# Patient Record
Sex: FEMALE | Race: BLACK OR AFRICAN AMERICAN | Employment: FULL TIME | URBAN - METROPOLITAN AREA
[De-identification: names, ages, dates, MRNs, and addresses within clinical notes are randomized per-mention and may not be internally consistent; named-entity substitution may affect disease eponyms.]

---

## 2017-01-12 ENCOUNTER — OFFICE VISIT (OUTPATIENT)
Dept: INTERNAL MEDICINE | Age: 52
End: 2017-01-12

## 2017-01-12 VITALS
RESPIRATION RATE: 16 BRPM | WEIGHT: 222 LBS | BODY MASS INDEX: 40.85 KG/M2 | TEMPERATURE: 97.8 F | HEART RATE: 68 BPM | DIASTOLIC BLOOD PRESSURE: 66 MMHG | SYSTOLIC BLOOD PRESSURE: 118 MMHG | HEIGHT: 62 IN

## 2017-01-12 DIAGNOSIS — Z23 FLU VACCINE NEED: ICD-10-CM

## 2017-01-12 DIAGNOSIS — K21.9 GASTROESOPHAGEAL REFLUX DISEASE, ESOPHAGITIS PRESENCE NOT SPECIFIED: ICD-10-CM

## 2017-01-12 DIAGNOSIS — I10 HTN (HYPERTENSION), BENIGN: Primary | ICD-10-CM

## 2017-01-12 DIAGNOSIS — E66.01 MORBID OBESITY DUE TO EXCESS CALORIES (HCC): ICD-10-CM

## 2017-01-12 DIAGNOSIS — E78.2 MIXED HYPERLIPIDEMIA: ICD-10-CM

## 2017-01-12 LAB
ALBUMIN SERPL-MCNC: 4.1 G/DL (ref 3.4–5)
ALP BLD-CCNC: 98 U/L (ref 40–129)
ALT SERPL-CCNC: 19 U/L (ref 10–40)
ANION GAP SERPL CALCULATED.3IONS-SCNC: 15 MMOL/L (ref 3–16)
AST SERPL-CCNC: 21 U/L (ref 15–37)
BASOPHILS ABSOLUTE: 0.1 K/UL (ref 0–0.2)
BASOPHILS RELATIVE PERCENT: 1.5 %
BILIRUB SERPL-MCNC: <0.2 MG/DL (ref 0–1)
BILIRUBIN DIRECT: <0.2 MG/DL (ref 0–0.3)
BILIRUBIN, INDIRECT: NORMAL MG/DL (ref 0–1)
BUN BLDV-MCNC: 12 MG/DL (ref 7–20)
CALCIUM SERPL-MCNC: 9.1 MG/DL (ref 8.3–10.6)
CHLORIDE BLD-SCNC: 100 MMOL/L (ref 99–110)
CHOLESTEROL, TOTAL: 225 MG/DL (ref 0–199)
CO2: 24 MMOL/L (ref 21–32)
CREAT SERPL-MCNC: 0.8 MG/DL (ref 0.6–1.1)
EOSINOPHILS ABSOLUTE: 0.3 K/UL (ref 0–0.6)
EOSINOPHILS RELATIVE PERCENT: 4.3 %
GFR AFRICAN AMERICAN: >60
GFR NON-AFRICAN AMERICAN: >60
GLUCOSE BLD-MCNC: 86 MG/DL (ref 70–99)
HCT VFR BLD CALC: 37.6 % (ref 36–48)
HDLC SERPL-MCNC: 70 MG/DL (ref 40–60)
HEMOGLOBIN: 12.3 G/DL (ref 12–16)
LDL CHOLESTEROL CALCULATED: 129 MG/DL
LYMPHOCYTES ABSOLUTE: 2.3 K/UL (ref 1–5.1)
LYMPHOCYTES RELATIVE PERCENT: 33.7 %
MCH RBC QN AUTO: 27.4 PG (ref 26–34)
MCHC RBC AUTO-ENTMCNC: 32.7 G/DL (ref 31–36)
MCV RBC AUTO: 83.6 FL (ref 80–100)
MONOCYTES ABSOLUTE: 0.6 K/UL (ref 0–1.3)
MONOCYTES RELATIVE PERCENT: 9 %
NEUTROPHILS ABSOLUTE: 3.5 K/UL (ref 1.7–7.7)
NEUTROPHILS RELATIVE PERCENT: 51.5 %
PDW BLD-RTO: 16 % (ref 12.4–15.4)
PLATELET # BLD: 290 K/UL (ref 135–450)
PMV BLD AUTO: 8 FL (ref 5–10.5)
POTASSIUM SERPL-SCNC: 4.3 MMOL/L (ref 3.5–5.1)
RBC # BLD: 4.5 M/UL (ref 4–5.2)
SODIUM BLD-SCNC: 139 MMOL/L (ref 136–145)
TOTAL PROTEIN: 7.1 G/DL (ref 6.4–8.2)
TRIGL SERPL-MCNC: 131 MG/DL (ref 0–150)
VLDLC SERPL CALC-MCNC: 26 MG/DL
WBC # BLD: 6.8 K/UL (ref 4–11)

## 2017-01-12 PROCEDURE — 99214 OFFICE O/P EST MOD 30 MIN: CPT | Performed by: INTERNAL MEDICINE

## 2017-01-12 PROCEDURE — G8484 FLU IMMUNIZE NO ADMIN: HCPCS | Performed by: INTERNAL MEDICINE

## 2017-01-12 PROCEDURE — 36415 COLL VENOUS BLD VENIPUNCTURE: CPT | Performed by: INTERNAL MEDICINE

## 2017-01-12 PROCEDURE — G8427 DOCREV CUR MEDS BY ELIG CLIN: HCPCS | Performed by: INTERNAL MEDICINE

## 2017-01-12 PROCEDURE — G0008 ADMIN INFLUENZA VIRUS VAC: HCPCS | Performed by: INTERNAL MEDICINE

## 2017-01-12 PROCEDURE — G8419 CALC BMI OUT NRM PARAM NOF/U: HCPCS | Performed by: INTERNAL MEDICINE

## 2017-01-12 PROCEDURE — 1036F TOBACCO NON-USER: CPT | Performed by: INTERNAL MEDICINE

## 2017-01-12 PROCEDURE — 90686 IIV4 VACC NO PRSV 0.5 ML IM: CPT | Performed by: INTERNAL MEDICINE

## 2017-01-12 PROCEDURE — 3017F COLORECTAL CA SCREEN DOC REV: CPT | Performed by: INTERNAL MEDICINE

## 2017-01-12 PROCEDURE — 3014F SCREEN MAMMO DOC REV: CPT | Performed by: INTERNAL MEDICINE

## 2017-01-12 ASSESSMENT — PATIENT HEALTH QUESTIONNAIRE - PHQ9
SUM OF ALL RESPONSES TO PHQ9 QUESTIONS 1 & 2: 0
2. FEELING DOWN, DEPRESSED OR HOPELESS: 0
1. LITTLE INTEREST OR PLEASURE IN DOING THINGS: 0
SUM OF ALL RESPONSES TO PHQ QUESTIONS 1-9: 0

## 2017-05-01 RX ORDER — LISINOPRIL 10 MG/1
TABLET ORAL
Qty: 90 TABLET | Refills: 3 | Status: SHIPPED | OUTPATIENT
Start: 2017-05-01 | End: 2018-04-18 | Stop reason: SDUPTHER

## 2017-06-28 ENCOUNTER — TELEPHONE (OUTPATIENT)
Dept: INTERNAL MEDICINE CLINIC | Age: 52
End: 2017-06-28

## 2017-06-28 ENCOUNTER — OFFICE VISIT (OUTPATIENT)
Dept: INTERNAL MEDICINE CLINIC | Age: 52
End: 2017-06-28

## 2017-06-28 VITALS
HEART RATE: 73 BPM | WEIGHT: 221 LBS | OXYGEN SATURATION: 95 % | DIASTOLIC BLOOD PRESSURE: 87 MMHG | BODY MASS INDEX: 41.72 KG/M2 | SYSTOLIC BLOOD PRESSURE: 119 MMHG | HEIGHT: 61 IN

## 2017-06-28 DIAGNOSIS — Z12.31 ENCOUNTER FOR SCREENING MAMMOGRAM FOR MALIGNANT NEOPLASM OF BREAST: ICD-10-CM

## 2017-06-28 DIAGNOSIS — Z00.00 PREVENTATIVE HEALTH CARE: ICD-10-CM

## 2017-06-28 DIAGNOSIS — E66.01 MORBID OBESITY DUE TO EXCESS CALORIES (HCC): ICD-10-CM

## 2017-06-28 DIAGNOSIS — I10 ESSENTIAL HYPERTENSION: Primary | ICD-10-CM

## 2017-06-28 PROCEDURE — 99214 OFFICE O/P EST MOD 30 MIN: CPT | Performed by: INTERNAL MEDICINE

## 2017-06-28 PROCEDURE — G8417 CALC BMI ABV UP PARAM F/U: HCPCS | Performed by: INTERNAL MEDICINE

## 2017-06-28 PROCEDURE — G8427 DOCREV CUR MEDS BY ELIG CLIN: HCPCS | Performed by: INTERNAL MEDICINE

## 2017-06-28 PROCEDURE — 3014F SCREEN MAMMO DOC REV: CPT | Performed by: INTERNAL MEDICINE

## 2017-06-28 PROCEDURE — 3017F COLORECTAL CA SCREEN DOC REV: CPT | Performed by: INTERNAL MEDICINE

## 2017-06-28 PROCEDURE — 1036F TOBACCO NON-USER: CPT | Performed by: INTERNAL MEDICINE

## 2017-06-28 ASSESSMENT — ENCOUNTER SYMPTOMS
BLURRED VISION: 0
SHORTNESS OF BREATH: 0
ORTHOPNEA: 0
GASTROINTESTINAL NEGATIVE: 1
ALLERGIC/IMMUNOLOGIC NEGATIVE: 1

## 2017-06-29 ENCOUNTER — TELEPHONE (OUTPATIENT)
Dept: INTERNAL MEDICINE CLINIC | Age: 52
End: 2017-06-29

## 2017-06-29 ENCOUNTER — TELEPHONE (OUTPATIENT)
Dept: BARIATRICS/WEIGHT MGMT | Age: 52
End: 2017-06-29

## 2017-11-09 ENCOUNTER — OFFICE VISIT (OUTPATIENT)
Dept: INTERNAL MEDICINE CLINIC | Age: 52
End: 2017-11-09

## 2017-11-09 VITALS
BODY MASS INDEX: 43.59 KG/M2 | HEART RATE: 64 BPM | SYSTOLIC BLOOD PRESSURE: 132 MMHG | DIASTOLIC BLOOD PRESSURE: 68 MMHG | WEIGHT: 222 LBS | HEIGHT: 60 IN | RESPIRATION RATE: 16 BRPM

## 2017-11-09 DIAGNOSIS — I10 HTN (HYPERTENSION), BENIGN: ICD-10-CM

## 2017-11-09 DIAGNOSIS — E78.2 MIXED HYPERLIPIDEMIA: ICD-10-CM

## 2017-11-09 DIAGNOSIS — Z00.00 PREVENTATIVE HEALTH CARE: ICD-10-CM

## 2017-11-09 DIAGNOSIS — E66.01 MORBID OBESITY DUE TO EXCESS CALORIES (HCC): ICD-10-CM

## 2017-11-09 DIAGNOSIS — I10 HTN (HYPERTENSION), BENIGN: Primary | ICD-10-CM

## 2017-11-09 LAB
T4 FREE: 0.9 NG/DL (ref 0.9–1.8)
TSH SERPL DL<=0.05 MIU/L-ACNC: 1.88 UIU/ML (ref 0.27–4.2)

## 2017-11-09 PROCEDURE — G0008 ADMIN INFLUENZA VIRUS VAC: HCPCS | Performed by: INTERNAL MEDICINE

## 2017-11-09 PROCEDURE — 3017F COLORECTAL CA SCREEN DOC REV: CPT | Performed by: INTERNAL MEDICINE

## 2017-11-09 PROCEDURE — G8484 FLU IMMUNIZE NO ADMIN: HCPCS | Performed by: INTERNAL MEDICINE

## 2017-11-09 PROCEDURE — 3014F SCREEN MAMMO DOC REV: CPT | Performed by: INTERNAL MEDICINE

## 2017-11-09 PROCEDURE — G8427 DOCREV CUR MEDS BY ELIG CLIN: HCPCS | Performed by: INTERNAL MEDICINE

## 2017-11-09 PROCEDURE — G8417 CALC BMI ABV UP PARAM F/U: HCPCS | Performed by: INTERNAL MEDICINE

## 2017-11-09 PROCEDURE — 1036F TOBACCO NON-USER: CPT | Performed by: INTERNAL MEDICINE

## 2017-11-09 PROCEDURE — 90686 IIV4 VACC NO PRSV 0.5 ML IM: CPT | Performed by: INTERNAL MEDICINE

## 2017-11-09 PROCEDURE — 99214 OFFICE O/P EST MOD 30 MIN: CPT | Performed by: INTERNAL MEDICINE

## 2017-11-09 ASSESSMENT — ENCOUNTER SYMPTOMS
COUGH: 0
GASTROINTESTINAL NEGATIVE: 1
SHORTNESS OF BREATH: 0

## 2017-11-10 LAB — HIV-1 AND HIV-2 ANTIBODIES: NORMAL

## 2017-11-10 NOTE — PROGRESS NOTES
Subjective:      Patient ID: Hayley Pizarro is a 47 y.o. female. F/u for HTN and Obesity, she feels good, her breathing is good and she deneis chest pain,  insurance will not cover her Bariatric care, she has been trying to diet on her own and she has started to exercise,          Review of Systems   Constitutional: Negative. Negative for activity change, appetite change and unexpected weight change. Eyes: Positive for visual disturbance. Respiratory: Negative for cough and shortness of breath. Cardiovascular: Negative. Negative for chest pain. Gastrointestinal: Negative. Endocrine: Negative. Negative for polydipsia, polyphagia and polyuria. Genitourinary: Negative. Musculoskeletal: Negative. Skin: Negative for rash. Allergic/Immunologic: Negative for immunocompromised state. Neurological: Negative. Negative for syncope, weakness and headaches. Hematological: Negative. Psychiatric/Behavioral: Negative. Negative for dysphoric mood and sleep disturbance. 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 She appears well-developed and well-nourished. She does not have a sickly appearance. No distress. Very obese habitus   HENT:   Head: Normocephalic and atraumatic. Neck: No JVD present. Cardiovascular: Normal rate, regular rhythm and normal heart sounds. Exam reveals no gallop. No murmur heard. Pulmonary/Chest: Effort normal and breath sounds normal. No respiratory distress. She has no wheezes. She has no rales.    Musculoskeletal: She exhibits no edema. Neurological: She is alert and oriented to person, place, and time. Coordination normal.   Skin: Skin is warm and dry. No rash noted. She is not diaphoretic. Psychiatric: She has a normal mood and affect. Her behavior is normal.   Vitals reviewed. /68 (Site: Right Arm, Position: Sitting, Cuff Size: Medium Adult)   Pulse 64 Comment: Regular  Resp 16   Ht 5' (1.524 m)   Wt 222 lb (100.7 kg)   BMI 43.36 kg/m²     Assessment:           ICD-10-CM ICD-9-CM    1. Hypertension, benign I10 401.1 TSH without Reflex      T4, Free   2. Morbid obesity due to excess calories (HCC) E66.01 278.01    3. Mixed hyperlipidemia E78.2 272.2    4. Preventative health care Z00.00 V70.0 HIV-1 AND HIV-2 ANTIBODIES      HTN controlled      her obesity is a major risk to her health and threat to her life      Plan:       continue current meds   check TFTs   reduce calories gradually by portion control, recommended weight watchers  Vaccinate today and screen for HIV    Orders Placed This Encounter   Procedures    INFLUENZA, QUADV, 3 YRS AND OLDER, IM, PF, PREFILL SYR OR SDV, 0.5ML (FLUZONE QUADV, PF)    TSH without Reflex    T4, Free    HIV-1 AND HIV-2 ANTIBODIES       Current Outpatient Prescriptions   Medication Sig Dispense Refill    lisinopril (PRINIVIL;ZESTRIL) 10 MG tablet TAKE ONE TABLET BY MOUTH DAILY 90 tablet 3    alum Hydroxide-Mag Carbonate 160-105 MG CHEW Chew 2 tablets every 4 hours as needed for indigestion.  Multiple Vitamin (MULTI VITAMIN DAILY PO) Take  by mouth.  calcium carbonate (OSCAL) 500 MG TABS tablet Take 600 mg by mouth daily.  Artificial Tear Ointment (DRY EYES OP) Apply 1 drop to eye 2 times daily       latanoprost (XALATAN) 0.005 % ophthalmic solution Place 1 drop into the right eye nightly.  brimonidine-timolol (COMBIGAN) 0.2-0.5 % ophthalmic solution Place 1 drop into the right eye every 12 hours.        No current facility-administered medications for this visit.       15 mins spent counseling patient on health maintenance, healthy diet, exercise, other therapeutic lifestyle changes, guideline indicated screening and vaccines for preventable diseases, addressing patient's concerns, reviewing test results through EMR and elsewhere as available, reviewing and summarizing health records, and coordinating care    AVS and education print provided    Macy Grullon  11/9/2017

## 2018-04-20 RX ORDER — LISINOPRIL 10 MG/1
TABLET ORAL
Qty: 90 TABLET | Refills: 2 | Status: SHIPPED | OUTPATIENT
Start: 2018-04-20 | End: 2018-10-22 | Stop reason: SDUPTHER

## 2018-10-22 RX ORDER — LISINOPRIL 10 MG/1
TABLET ORAL
Qty: 30 TABLET | Refills: 0 | Status: SHIPPED | OUTPATIENT
Start: 2018-10-22 | End: 2018-10-25 | Stop reason: SDUPTHER

## 2018-10-25 ENCOUNTER — OFFICE VISIT (OUTPATIENT)
Dept: INTERNAL MEDICINE CLINIC | Age: 53
End: 2018-10-25
Payer: MEDICARE

## 2018-10-25 VITALS
WEIGHT: 218.2 LBS | OXYGEN SATURATION: 99 % | BODY MASS INDEX: 41.19 KG/M2 | DIASTOLIC BLOOD PRESSURE: 80 MMHG | HEART RATE: 62 BPM | RESPIRATION RATE: 12 BRPM | HEIGHT: 61 IN | SYSTOLIC BLOOD PRESSURE: 138 MMHG

## 2018-10-25 DIAGNOSIS — I10 HTN (HYPERTENSION), BENIGN: Primary | ICD-10-CM

## 2018-10-25 DIAGNOSIS — Z23 NEEDS FLU SHOT: ICD-10-CM

## 2018-10-25 DIAGNOSIS — Z12.11 COLON CANCER SCREENING: ICD-10-CM

## 2018-10-25 DIAGNOSIS — Z86.718 HISTORY OF DVT (DEEP VEIN THROMBOSIS): ICD-10-CM

## 2018-10-25 DIAGNOSIS — E66.01 MORBID OBESITY WITH BMI OF 40.0-44.9, ADULT (HCC): ICD-10-CM

## 2018-10-25 DIAGNOSIS — Z23 NEED FOR PROPHYLACTIC VACCINATION AND INOCULATION AGAINST VARICELLA: ICD-10-CM

## 2018-10-25 DIAGNOSIS — I10 HTN (HYPERTENSION), BENIGN: ICD-10-CM

## 2018-10-25 DIAGNOSIS — D12.6 ADENOMATOUS POLYP OF COLON, UNSPECIFIED PART OF COLON: ICD-10-CM

## 2018-10-25 PROCEDURE — G8510 SCR DEP NEG, NO PLAN REQD: HCPCS | Performed by: INTERNAL MEDICINE

## 2018-10-25 PROCEDURE — 90686 IIV4 VACC NO PRSV 0.5 ML IM: CPT | Performed by: INTERNAL MEDICINE

## 2018-10-25 PROCEDURE — 3017F COLORECTAL CA SCREEN DOC REV: CPT | Performed by: INTERNAL MEDICINE

## 2018-10-25 PROCEDURE — G0008 ADMIN INFLUENZA VIRUS VAC: HCPCS | Performed by: INTERNAL MEDICINE

## 2018-10-25 PROCEDURE — 1036F TOBACCO NON-USER: CPT | Performed by: INTERNAL MEDICINE

## 2018-10-25 PROCEDURE — G8482 FLU IMMUNIZE ORDER/ADMIN: HCPCS | Performed by: INTERNAL MEDICINE

## 2018-10-25 PROCEDURE — G8417 CALC BMI ABV UP PARAM F/U: HCPCS | Performed by: INTERNAL MEDICINE

## 2018-10-25 PROCEDURE — G8427 DOCREV CUR MEDS BY ELIG CLIN: HCPCS | Performed by: INTERNAL MEDICINE

## 2018-10-25 PROCEDURE — 99214 OFFICE O/P EST MOD 30 MIN: CPT | Performed by: INTERNAL MEDICINE

## 2018-10-25 RX ORDER — LISINOPRIL 10 MG/1
TABLET ORAL
Qty: 90 TABLET | Refills: 1 | Status: SHIPPED | OUTPATIENT
Start: 2018-10-25 | End: 2019-06-25 | Stop reason: SDUPTHER

## 2018-10-25 ASSESSMENT — ENCOUNTER SYMPTOMS
DIARRHEA: 0
COUGH: 0
SINUS PRESSURE: 0
CONSTIPATION: 0
SINUS PAIN: 0
WHEEZING: 0
PHOTOPHOBIA: 0
BACK PAIN: 0
ABDOMINAL PAIN: 0
TROUBLE SWALLOWING: 0
COLOR CHANGE: 0
VOMITING: 0
SHORTNESS OF BREATH: 0
EYE PAIN: 0
NAUSEA: 0

## 2018-10-25 ASSESSMENT — PATIENT HEALTH QUESTIONNAIRE - PHQ9
SUM OF ALL RESPONSES TO PHQ9 QUESTIONS 1 & 2: 0
1. LITTLE INTEREST OR PLEASURE IN DOING THINGS: 0
SUM OF ALL RESPONSES TO PHQ QUESTIONS 1-9: 0
SUM OF ALL RESPONSES TO PHQ QUESTIONS 1-9: 0
2. FEELING DOWN, DEPRESSED OR HOPELESS: 0

## 2018-10-25 NOTE — PROGRESS NOTES
difficulty, weakness, light-headedness, numbness and headaches. Hematological: Negative for adenopathy (  ). Does not bruise/bleed easily. Psychiatric/Behavioral: Negative for dysphoric mood and sleep disturbance. The patient is not nervous/anxious. Prior to Visit Medications    Medication Sig Taking? Authorizing Provider   zoster recombinant adjuvanted vaccine (SHINGRIX) 50 MCG/0.5ML SUSR injection 50 MCG IM then repeat 2-6 months. Yes Geovany Byrne MD   lisinopril (PRINIVIL;ZESTRIL) 10 MG tablet TAKE ONE TABLET BY MOUTH DAILY Yes Geovany Byrne MD   alum Hydroxide-Mag Carbonate 160-105 MG CHEW Chew 2 tablets every 4 hours as needed for indigestion. Yes Historical Provider, MD   Artificial Tear Ointment (DRY EYES OP) Apply 1 drop to eye 2 times daily  Yes Historical Provider, MD   latanoprost (XALATAN) 0.005 % ophthalmic solution Place 1 drop into the right eye nightly. Yes Historical Provider, MD   brimonidine-timolol (COMBIGAN) 0.2-0.5 % ophthalmic solution Place 1 drop into the right eye every 12 hours. 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 Dr. Ariel Booth. Repeat in 3 years.     PLANTAR FASCIA SURGERY  2003    Left foot - endoscopic   

## 2018-10-26 LAB
ANION GAP SERPL CALCULATED.3IONS-SCNC: 14 MMOL/L (ref 3–16)
BUN BLDV-MCNC: 12 MG/DL (ref 7–20)
CALCIUM SERPL-MCNC: 9.4 MG/DL (ref 8.3–10.6)
CHLORIDE BLD-SCNC: 101 MMOL/L (ref 99–110)
CHOLESTEROL, TOTAL: 221 MG/DL (ref 0–199)
CO2: 26 MMOL/L (ref 21–32)
CREAT SERPL-MCNC: 0.8 MG/DL (ref 0.6–1.1)
GFR AFRICAN AMERICAN: >60
GFR NON-AFRICAN AMERICAN: >60
GLUCOSE BLD-MCNC: 86 MG/DL (ref 70–99)
HDLC SERPL-MCNC: 61 MG/DL (ref 40–60)
LDL CHOLESTEROL CALCULATED: 136 MG/DL
POTASSIUM SERPL-SCNC: 4.7 MMOL/L (ref 3.5–5.1)
SODIUM BLD-SCNC: 141 MMOL/L (ref 136–145)
TRIGL SERPL-MCNC: 121 MG/DL (ref 0–150)
VLDLC SERPL CALC-MCNC: 24 MG/DL

## 2018-10-31 ENCOUNTER — TELEPHONE (OUTPATIENT)
Dept: INTERNAL MEDICINE CLINIC | Age: 53
End: 2018-10-31

## 2018-10-31 DIAGNOSIS — I10 HTN (HYPERTENSION), BENIGN: ICD-10-CM

## 2018-10-31 DIAGNOSIS — E66.01 MORBID OBESITY DUE TO EXCESS CALORIES (HCC): Primary | ICD-10-CM

## 2018-10-31 DIAGNOSIS — E78.2 MIXED HYPERLIPIDEMIA: ICD-10-CM

## 2019-01-11 ENCOUNTER — TELEPHONE (OUTPATIENT)
Dept: INTERNAL MEDICINE CLINIC | Age: 54
End: 2019-01-11

## 2019-06-25 DIAGNOSIS — I10 HTN (HYPERTENSION), BENIGN: ICD-10-CM

## 2019-06-25 RX ORDER — LISINOPRIL 10 MG/1
TABLET ORAL
Qty: 90 TABLET | Refills: 0 | Status: SHIPPED | OUTPATIENT
Start: 2019-06-25 | End: 2019-09-30 | Stop reason: SDUPTHER

## 2019-09-30 ENCOUNTER — TELEPHONE (OUTPATIENT)
Dept: INTERNAL MEDICINE CLINIC | Age: 54
End: 2019-09-30

## 2019-09-30 DIAGNOSIS — I10 HTN (HYPERTENSION), BENIGN: ICD-10-CM

## 2019-09-30 RX ORDER — LISINOPRIL 10 MG/1
10 TABLET ORAL DAILY
Qty: 90 TABLET | Refills: 0 | Status: SHIPPED | OUTPATIENT
Start: 2019-09-30 | End: 2019-12-30

## 2019-09-30 NOTE — TELEPHONE ENCOUNTER
Last appointment: 10/25/2018  Next appointment: Visit date not found  Last refill: 6/25/19    Unable to reach patient, telephone just kept ringing. Sh is over due for follow up.  Needs to be scheduled and then enough medication will be given to last her until her appointment

## 2019-09-30 NOTE — TELEPHONE ENCOUNTER
Patient is requesting the following prescription refill please be sent to a new pharmacy:    1.    lisinopril (PRINIVIL;ZESTRIL) 10 MG tablet TAKE ONE TABLET BY MOUTH DAILY     #90    [facility]/pharmacy #[id number] Cornelius Romo, [address] - F [phone]     Patient made aware to allow 24 to 48 business hours for prescription refills. Patient can be reached @ phone # provided should there be any questions.

## 2019-11-01 ENCOUNTER — TELEPHONE (OUTPATIENT)
Dept: INTERNAL MEDICINE CLINIC | Age: 54
End: 2019-11-01

## 2019-11-01 NOTE — TELEPHONE ENCOUNTER
FYI:    Patient states her Current Insurance Plan terminated last night due to her company. She states she will call back to re-schedule. I informed her of [address], and she stated she will call back to re-schedule in December.

## 2019-12-30 DIAGNOSIS — I10 HTN (HYPERTENSION), BENIGN: ICD-10-CM

## 2019-12-30 RX ORDER — LISINOPRIL 10 MG/1
TABLET ORAL
Qty: 90 TABLET | Refills: 0 | Status: SHIPPED | OUTPATIENT
Start: 2019-12-30 | End: 2020-02-06 | Stop reason: SDUPTHER

## 2020-02-06 ENCOUNTER — OFFICE VISIT (OUTPATIENT)
Dept: INTERNAL MEDICINE CLINIC | Age: 55
End: 2020-02-06
Payer: COMMERCIAL

## 2020-02-06 VITALS
RESPIRATION RATE: 14 BRPM | BODY MASS INDEX: 40.64 KG/M2 | HEART RATE: 76 BPM | DIASTOLIC BLOOD PRESSURE: 78 MMHG | HEIGHT: 60 IN | SYSTOLIC BLOOD PRESSURE: 128 MMHG | OXYGEN SATURATION: 98 % | WEIGHT: 207 LBS

## 2020-02-06 PROBLEM — R73.03 PREDIABETES: Status: ACTIVE | Noted: 2020-02-06

## 2020-02-06 PROCEDURE — 3017F COLORECTAL CA SCREEN DOC REV: CPT | Performed by: INTERNAL MEDICINE

## 2020-02-06 PROCEDURE — G8417 CALC BMI ABV UP PARAM F/U: HCPCS | Performed by: INTERNAL MEDICINE

## 2020-02-06 PROCEDURE — 1036F TOBACCO NON-USER: CPT | Performed by: INTERNAL MEDICINE

## 2020-02-06 PROCEDURE — 99214 OFFICE O/P EST MOD 30 MIN: CPT | Performed by: INTERNAL MEDICINE

## 2020-02-06 PROCEDURE — G8427 DOCREV CUR MEDS BY ELIG CLIN: HCPCS | Performed by: INTERNAL MEDICINE

## 2020-02-06 PROCEDURE — G8482 FLU IMMUNIZE ORDER/ADMIN: HCPCS | Performed by: INTERNAL MEDICINE

## 2020-02-06 RX ORDER — LISINOPRIL 10 MG/1
10 TABLET ORAL DAILY
Qty: 90 TABLET | Refills: 3 | Status: SHIPPED | OUTPATIENT
Start: 2020-02-06 | End: 2021-01-07

## 2020-02-06 ASSESSMENT — ENCOUNTER SYMPTOMS
WHEEZING: 0
COUGH: 0
PHOTOPHOBIA: 0
COLOR CHANGE: 0
TROUBLE SWALLOWING: 0
ABDOMINAL PAIN: 0
BACK PAIN: 0
VOMITING: 0
CONSTIPATION: 0
SINUS PAIN: 0
EYE PAIN: 0
SINUS PRESSURE: 0
NAUSEA: 0
DIARRHEA: 0
SHORTNESS OF BREATH: 0

## 2020-02-06 ASSESSMENT — PATIENT HEALTH QUESTIONNAIRE - PHQ9
SUM OF ALL RESPONSES TO PHQ QUESTIONS 1-9: 0
2. FEELING DOWN, DEPRESSED OR HOPELESS: 0
SUM OF ALL RESPONSES TO PHQ9 QUESTIONS 1 & 2: 0
SUM OF ALL RESPONSES TO PHQ QUESTIONS 1-9: 0
1. LITTLE INTEREST OR PLEASURE IN DOING THINGS: 0

## 2020-02-06 NOTE — PROGRESS NOTES
 Breast Cancer Neg Hx     Colon Cancer Neg Hx        Social History     Socioeconomic History    Marital status:      Spouse name: Not on file    Number of children: 0    Years of education: 12    Highest education level: Not on file   Occupational History    Occupation:      Comment: [employer]   Social Needs    Financial resource strain: Not on file    Food insecurity:     Worry: Not on file     Inability: Not on file    Transportation needs:     Medical: Not on file     Non-medical: Not on file   Tobacco Use    Smoking status: Never Smoker    Smokeless tobacco: Never Used   Substance and Sexual Activity    Alcohol use: No    Drug use: No    Sexual activity: Not Currently   Lifestyle    Physical activity:     Days per week: Not on file     Minutes per session: Not on file    Stress: Not on file   Relationships    Social connections:     Talks on phone: Not on file     Gets together: Not on file     Attends Mormonism service: Not on file     Active member of club or organization: Not on file     Attends meetings of clubs or organizations: Not on file     Relationship status: Not on file    Intimate partner violence:     Fear of current or ex partner: Not on file     Emotionally abused: Not on file     Physically abused: Not on file     Forced sexual activity: Not on file   Other Topics Concern    Not on file   Social History Narrative    Not on file       Review of Systems   Constitutional: Negative for appetite change, fatigue, fever and unexpected weight change. No night sweats   HENT: Negative for congestion, ear pain, hearing loss, nosebleeds, sinus pressure, sinus pain, sneezing, tinnitus and trouble swallowing. Eyes: Negative for photophobia, pain and visual disturbance. Respiratory: Negative for cough, shortness of breath and wheezing. Cardiovascular: Negative for chest pain, palpitations and leg swelling.    Gastrointestinal: Negative for abdominal pain, Exam  Constitutional:       General: She is not in acute distress. Appearance: Normal appearance. She is not diaphoretic. HENT:      Head: Normocephalic and atraumatic. Right Ear: External ear normal.      Left Ear: External ear normal.      Nose: Nose normal.      Mouth/Throat:      Mouth: Mucous membranes are moist.      Pharynx: Oropharynx is clear. Eyes:      General: No scleral icterus. Conjunctiva/sclera: Conjunctivae normal.      Comments: Cloudy corneas   Neck:      Musculoskeletal: Normal range of motion. Comments: No JVD at 90 deg  Cardiovascular:      Rate and Rhythm: Normal rate and regular rhythm. Pulses: Normal pulses. Heart sounds: Normal heart sounds. No murmur. No friction rub. No gallop. Pulmonary:      Effort: Pulmonary effort is normal.      Breath sounds: Normal breath sounds. No wheezing, rhonchi or rales. Abdominal:      General: Abdomen is flat. Bowel sounds are normal.   Musculoskeletal:         General: No deformity. Right lower leg: No edema. Left lower leg: No edema. Skin:     General: Skin is warm and dry. Findings: No rash. Neurological:      General: No focal deficit present. Mental Status: She is alert. Coordination: Coordination normal.      Gait: Gait normal.   Psychiatric:         Mood and Affect: Mood normal.         Behavior: Behavior normal.           Available labs reviewed    ASSESSMENT/PLAN:    Perry Salazar was seen today for follow-up. Diagnoses and all orders for this visit:    Hypertension, benign  Are well controlled on lisinopril 10 mg daily. She is due for lab work today will obtain. Her ASCVD risk is still under threshold for treatment. Encourage regular exercise and continued weight loss. -     lisinopril (PRINIVIL;ZESTRIL) 10 MG tablet; Take 1 tablet by mouth daily  -     Comprehensive Metabolic Panel;  Future    Colon cancer screening  History of adenomatous polyp of colon  She is at year 4 of her

## 2020-06-25 ENCOUNTER — TELEPHONE (OUTPATIENT)
Dept: INTERNAL MEDICINE CLINIC | Age: 55
End: 2020-06-25

## 2020-08-06 ENCOUNTER — OFFICE VISIT (OUTPATIENT)
Dept: INTERNAL MEDICINE CLINIC | Age: 55
End: 2020-08-06
Payer: COMMERCIAL

## 2020-08-06 VITALS
SYSTOLIC BLOOD PRESSURE: 138 MMHG | OXYGEN SATURATION: 99 % | TEMPERATURE: 97.5 F | DIASTOLIC BLOOD PRESSURE: 84 MMHG | BODY MASS INDEX: 40.19 KG/M2 | WEIGHT: 205.8 LBS | HEART RATE: 74 BPM | RESPIRATION RATE: 16 BRPM

## 2020-08-06 PROCEDURE — G8427 DOCREV CUR MEDS BY ELIG CLIN: HCPCS | Performed by: INTERNAL MEDICINE

## 2020-08-06 PROCEDURE — 3017F COLORECTAL CA SCREEN DOC REV: CPT | Performed by: INTERNAL MEDICINE

## 2020-08-06 PROCEDURE — 99214 OFFICE O/P EST MOD 30 MIN: CPT | Performed by: INTERNAL MEDICINE

## 2020-08-06 PROCEDURE — 1036F TOBACCO NON-USER: CPT | Performed by: INTERNAL MEDICINE

## 2020-08-06 PROCEDURE — G8417 CALC BMI ABV UP PARAM F/U: HCPCS | Performed by: INTERNAL MEDICINE

## 2020-08-06 ASSESSMENT — ENCOUNTER SYMPTOMS
CONSTIPATION: 0
COLOR CHANGE: 0
COUGH: 0
WHEEZING: 0
BACK PAIN: 0
EYE PAIN: 0
SHORTNESS OF BREATH: 0
SINUS PAIN: 0
NAUSEA: 0
SINUS PRESSURE: 0
DIARRHEA: 0
PHOTOPHOBIA: 0
VOMITING: 0
TROUBLE SWALLOWING: 0
ABDOMINAL PAIN: 0

## 2020-08-06 NOTE — PROGRESS NOTES
[facility]  Internal Medicine  Follow Up  Andreas Marcelino MD      SUBJECTIVE:  Lucita Waller is a 53 y. o.female    Chief Complaint   Patient presents with    Hypertension     Having a colonoscopy next month for multiple adenomatous polyps with Dr. Sayda Chu and needs COVID testing prior. She is a year late for this colonoscopy. HTN - taking lisinopril 10mg daily without issue. Staying active - still praise dancing. Still do stretching exercises. Not walking outside right now because doesn't feel comfortable. Not checking her Bps at home. Prediabetes - she has stopped eating so much candy. Weight down 2 lbs from 2/20 and 18 lbs from 2018. She is still praise dancing and has but back on cranberry juice some. 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 Dr. Caterina Carrillo. Repeat in 3 years.  PLANTAR FASCIA SURGERY  2003    Left foot - endoscopic    TONSILLECTOMY AND ADENOIDECTOMY  Child    TUBAL LIGATION         Family History   Problem Relation Age of Onset    Hypertension Mother         DM, Rheumatoid arthritis, CKD    Diabetes Mother     Hypertension Father         Prostate cancer, Hyperlipidemia    Prostate Cancer Father     Prostate Cancer Brother     Other Brother         Healthy    Other Sister         Healthy    Other Sister         Healthy    Breast Cancer Neg Hx     Colon Cancer Neg Hx        Social History     Socioeconomic History    Marital status:       Spouse name: Not on file    Number of children: 0    Years of education: 12    Highest education level: Not on file   Occupational History    Occupation:      Comment: [employer]   Social Needs    Financial resource strain: Not on file    Food insecurity     Worry: Not on file     Inability: Not on file   [employer] needs     Medical: Not on file     Non-medical: Not on file   Tobacco Use    Smoking status: Never Smoker    Smokeless tobacco: Never Used   Substance and Sexual Activity    Alcohol use: No    Drug use: No    Sexual activity: Not Currently   Lifestyle    Physical activity     Days per week: Not on file     Minutes per session: Not on file    Stress: Not on file   Relationships    Social connections     Talks on phone: Not on file     Gets together: Not on file     Attends Congregational service: Not on file     Active member of club or organization: Not on file     Attends meetings of clubs or organizations: Not on file     Relationship status: Not on file    Intimate partner violence     Fear of current or ex partner: Not on file     Emotionally abused: Not on file     Physically abused: Not on file     Forced sexual activity: Not on file   Other Topics Concern    Not on file   Social History Narrative    Not on file       Review of Systems   Constitutional: Negative for appetite change, fatigue, fever and unexpected weight change. No night sweats   HENT: Negative for congestion, ear pain, hearing loss, nosebleeds, sinus pressure, sinus pain, sneezing, tinnitus and trouble swallowing. Eyes: Negative for photophobia, pain and visual disturbance. Respiratory: Negative for cough, shortness of breath and wheezing. Cardiovascular: Negative for chest pain, palpitations and leg swelling. Gastrointestinal: Negative for abdominal pain, constipation, diarrhea, nausea and vomiting. Endocrine: Negative for cold intolerance, heat intolerance, polydipsia, polyphagia and polyuria.    Genitourinary: Negative for difficulty urinating, dysuria, frequency, hematuria and urgency. Musculoskeletal: Negative for arthralgias, back pain, joint swelling, myalgias and neck pain. Skin: Negative for color change and rash. Allergic/Immunologic: Negative for environmental allergies, food allergies and immunocompromised state. Neurological: Negative for dizziness, syncope, speech difficulty, weakness, light-headedness, numbness and headaches. Hematological: Negative for adenopathy. Does not bruise/bleed easily. Psychiatric/Behavioral: Negative for dysphoric mood and sleep disturbance. The patient is not nervous/anxious. Current Outpatient Medications on File Prior to Visit   Medication Sig Dispense Refill    lisinopril (PRINIVIL;ZESTRIL) 10 MG tablet Take 1 tablet by mouth daily 90 tablet 3    alum Hydroxide-Mag Carbonate 160-105 MG CHEW Chew 2 tablets every 4 hours as needed for indigestion.  Artificial Tear Ointment (DRY EYES OP) Apply 1 drop to eye 2 times daily       latanoprost (XALATAN) 0.005 % ophthalmic solution Place 1 drop into the right eye nightly.  brimonidine-timolol (COMBIGAN) 0.2-0.5 % ophthalmic solution Place 1 drop into the right eye every 12 hours. No current facility-administered medications on file prior to visit. OBJECTIVE:  BP (!) 142/78   Pulse 74   Temp 97.5 °F (36.4 °C) (Temporal)   Resp 16   Wt 205 lb 12.8 oz (93.4 kg)   SpO2 99%   BMI 40.19 kg/m²   Wt Readings from Last 3 Encounters:   08/06/20 205 lb 12.8 oz (93.4 kg)   02/06/20 207 lb (93.9 kg)   10/25/18 218 lb 3.2 oz (99 kg)     Body mass index is 40.19 kg/m². BP Readings from Last 3 Encounters:   08/06/20 (!) 142/78   02/06/20 128/78   10/25/18 138/80     Pulse Readings from Last 3 Encounters:   08/06/20 74   02/06/20 76   10/25/18 62       Physical Exam  Constitutional:       General: She is not in acute distress. Appearance: Normal appearance. She is not diaphoretic. HENT:      Head: Normocephalic and atraumatic. Right Ear: External ear normal.      Left Ear: External ear normal.      Nose: Nose normal.      Mouth/Throat:      Mouth: Mucous membranes are moist.      Pharynx: Oropharynx is clear. Eyes:      General: No scleral icterus. Conjunctiva/sclera: Conjunctivae normal.   Neck:      Musculoskeletal: Normal range of motion. Cardiovascular:      Rate and Rhythm: Normal rate and regular rhythm. Pulses: Normal pulses. Heart sounds: Normal heart sounds. No murmur. No friction rub. No gallop. Pulmonary:      Effort: Pulmonary effort is normal.      Breath sounds: Normal breath sounds. No wheezing, rhonchi or rales. Abdominal:      General: Abdomen is flat. Bowel sounds are normal.   Musculoskeletal:         General: No deformity. Right lower leg: No edema. Left lower leg: No edema. Skin:     General: Skin is warm and dry. Findings: No rash. Neurological:      General: No focal deficit present. Mental Status: She is alert. Coordination: Coordination normal.      Gait: Gait normal.   Psychiatric:         Mood and Affect: Mood normal.         Behavior: Behavior normal.           Available labs reviewed    ASSESSMENT/PLAN:    Maria L Moss was seen today for hypertension. Diagnoses and all orders for this visit:    Dyslipidemia  Monitoring her lipids in the setting of risk factors. She is not at the threshold for statin at this time. Discussed lifestyle modifications. The 10-year ASCVD risk score (No Mcghee., et al., 2013) is: 6.2%    Values used to calculate the score:      Age: 53 years      Sex: Female      Is Non- : Yes      Diabetic: No      Tobacco smoker: No      Systolic Blood Pressure: 828 mmHg      Is BP treated: Yes      HDL Cholesterol: 67 mg/dL      Total Cholesterol: 239 mg/dL      Adenomatous polyp of colon, unspecified part of colon  Due for colonoscopy. She has a scheduled next month.   She will need COVID testing prior but does not

## 2020-08-07 ENCOUNTER — TELEPHONE (OUTPATIENT)
Dept: INTERNAL MEDICINE CLINIC | Age: 55
End: 2020-08-07

## 2020-08-07 NOTE — TELEPHONE ENCOUNTER
Colonoscopy scheduled for Sept 9th at 11:30 at [facility]. WIll need to have COVID testing 1 week prior. Provided locations for COVID testing. She does not drive, so she will need to find out best location for the person who will be taking.  MEGANB

## 2020-09-03 ENCOUNTER — OFFICE VISIT (OUTPATIENT)
Dept: PRIMARY CARE CLINIC | Age: 55
End: 2020-09-03
Payer: COMMERCIAL

## 2020-09-03 PROCEDURE — G8428 CUR MEDS NOT DOCUMENT: HCPCS | Performed by: NURSE PRACTITIONER

## 2020-09-03 PROCEDURE — 99211 OFF/OP EST MAY X REQ PHY/QHP: CPT | Performed by: NURSE PRACTITIONER

## 2020-09-03 PROCEDURE — G8417 CALC BMI ABV UP PARAM F/U: HCPCS | Performed by: NURSE PRACTITIONER

## 2020-09-04 LAB — SARS-COV-2, NAA: NOT DETECTED

## 2020-09-09 ENCOUNTER — HOSPITAL ENCOUNTER (OUTPATIENT)
Age: 55
Setting detail: OUTPATIENT SURGERY
Discharge: HOME | End: 2020-09-09
Attending: INTERNAL MEDICINE | Admitting: INTERNAL MEDICINE
Payer: COMMERCIAL

## 2020-09-09 VITALS
BODY MASS INDEX: 37.73 KG/M2 | TEMPERATURE: 97.1 F | WEIGHT: 205 LBS | HEIGHT: 62 IN | DIASTOLIC BLOOD PRESSURE: 75 MMHG | RESPIRATION RATE: 16 BRPM | HEART RATE: 73 BPM | SYSTOLIC BLOOD PRESSURE: 108 MMHG | OXYGEN SATURATION: 100 %

## 2020-09-09 LAB
GLUCOSE BLD-MCNC: 94 MG/DL (ref 70–99)
PERFORMED ON: NORMAL

## 2020-09-09 PROCEDURE — 7100000011 HC PHASE II RECOVERY - ADDTL 15 MIN: Performed by: INTERNAL MEDICINE

## 2020-09-09 PROCEDURE — 3609010300 HC COLONOSCOPY W/BIOPSY SINGLE/MULTIPLE: Performed by: INTERNAL MEDICINE

## 2020-09-09 PROCEDURE — 2709999900 HC NON-CHARGEABLE SUPPLY: Performed by: INTERNAL MEDICINE

## 2020-09-09 PROCEDURE — 88305 TISSUE EXAM BY PATHOLOGIST: CPT

## 2020-09-09 PROCEDURE — 99152 MOD SED SAME PHYS/QHP 5/>YRS: CPT | Performed by: INTERNAL MEDICINE

## 2020-09-09 PROCEDURE — 7100000010 HC PHASE II RECOVERY - FIRST 15 MIN: Performed by: INTERNAL MEDICINE

## 2020-09-09 PROCEDURE — 6360000002 HC RX W HCPCS: Performed by: INTERNAL MEDICINE

## 2020-09-09 RX ORDER — FENTANYL CITRATE 50 UG/ML
INJECTION, SOLUTION INTRAMUSCULAR; INTRAVENOUS PRN
Status: DISCONTINUED | OUTPATIENT
Start: 2020-09-09 | End: 2020-09-09 | Stop reason: ALTCHOICE

## 2020-09-09 RX ORDER — MIDAZOLAM HYDROCHLORIDE 1 MG/ML
INJECTION INTRAMUSCULAR; INTRAVENOUS PRN
Status: DISCONTINUED | OUTPATIENT
Start: 2020-09-09 | End: 2020-09-09 | Stop reason: ALTCHOICE

## 2020-09-09 ASSESSMENT — PAIN SCALES - GENERAL
PAINLEVEL_OUTOF10: 0

## 2020-09-09 ASSESSMENT — PAIN - FUNCTIONAL ASSESSMENT
PAIN_FUNCTIONAL_ASSESSMENT: 0-10
PAIN_FUNCTIONAL_ASSESSMENT: 0-10
PAIN_FUNCTIONAL_ASSESSMENT: FACES

## 2020-09-09 NOTE — H&P
of children: 0    Years of education: 12    Highest education level: Not on file   Occupational History    Occupation:      Comment: [employer]   Social Needs    Financial resource strain: Not on file    Food insecurity     Worry: Not on file     Inability: Not on file   [employer] needs     Medical: Not on file     Non-medical: Not on file   Tobacco Use    Smoking status: Never Smoker    Smokeless tobacco: Never Used   Substance and Sexual Activity    Alcohol use: No    Drug use: No    Sexual activity: Not Currently   Lifestyle    Physical activity     Days per week: Not on file     Minutes per session: Not on file    Stress: Not on file   Relationships    Social connections     Talks on phone: Not on file     Gets together: Not on file     Attends Mormonism service: Not on file     Active member of club or organization: Not on file     Attends meetings of clubs or organizations: Not on file     Relationship status: Not on file    Intimate partner violence     Fear of current or ex partner: Not on file     Emotionally abused: Not on file     Physically abused: Not on file     Forced sexual activity: Not on file   Other Topics Concern    Not on file   Social History Narrative    Not on file       Family Hx:   Family History   Problem Relation Age of Onset    Hypertension Mother         DM, Rheumatoid arthritis, CKD    Diabetes Mother     Hypertension Father         Prostate cancer, Hyperlipidemia    Prostate Cancer Father     Prostate Cancer Brother     Other Brother         Healthy    Other Sister         Healthy    Other Sister         Healthy    Breast Cancer Neg Hx     Colon Cancer Neg Hx        Physical Exam:  Vital Signs: BP (!) 146/88   Pulse 71   Temp 96.6 °F (35.9 °C) (Temporal)   Resp 16   Ht 5' 1.5\" (1.562 m)   Wt 205 lb (93 kg)   SpO2 100%   BMI 38.11 kg/m²    Pulmonary: Normal  Cardiac: Normal  Abdomen: Normal    Pre-Procedure Assessment / Plan:  ASA Classification: Class 2 - A normal healthy patient with mild systemic disease  Level of Sedation Plan: Moderate sedation   Mallampati Score: I (soft palate, uvula, fauces, tonsillar pillars visible)  Post Procedure plan: Return to same level of care    Colonoscopy Interval History:  3 or more years since last colonoscopy, Less than 3 years since the patient's last colonoscopy due to medical reasons and Less than 3 years since the patient's last colonoscopy due to system reasons    Medical Reason for Colonoscopy before 3 years last colonoscopy incomplete, last colonoscopy had inadequate prep, piecemeal removal of adenomas and last colonoscopy found greater than 10 adenomas  System Reasons for Colonoscopy before 3 years:   previous colonoscopy report unavailable or unable to locate    I assessed the patient and find that the patient is in satisfactory condition to proceed with the planned procedure and sedation plan. Risks/benefits/alternatives of procedure discussed with patient and any present family members. Risks including, but not limited to: bleeding, perforation, post polypectomy syndrome, splenic injury, need for additional procedures or surgery, risks of anesthesia. Patient understands it is their responsibility to call office for pathology results if they do not hear from my office within 1-2 weeks. All questions answered.     Linda Faustin MD  9/9/2020

## 2020-09-09 NOTE — BRIEF OP NOTE
Brief Postoperative Note      Patient: Alessandro Hanley  YOB: 1965  MRN: [id number]    Date of Procedure: 9/9/2020    Pre-Op Diagnosis: Hx of colonic polyps [Z86.010]    Post-Op Diagnosis: Same       Procedure(s):  COLONOSCOPY WITH BIOPSY    Surgeon(s):  Estuardo Rueda MD    Assistant:  * No surgical staff found *    Anesthesia: IV Sedation    EBL NONe    Estimated Blood Loss (mL): Minimal    Complications: None    Specimens:   ID Type Source Tests Collected by Time Destination   A : Forceps Bx Sigmoid Polyp Tissue Colon SURGICAL PATHOLOGY Estuardo Rueda MD 9/9/2020 1332        Implants:  * No implants in log *      Drains: * No LDAs found *    Findings: polyp    Electronically signed by Tesha Pichardo MD on 9/9/2020 at 1:59 PM

## 2021-01-06 DIAGNOSIS — I10 HTN (HYPERTENSION), BENIGN: ICD-10-CM

## 2021-01-07 RX ORDER — LISINOPRIL 10 MG/1
TABLET ORAL
Qty: 90 TABLET | Refills: 2 | Status: SHIPPED | OUTPATIENT
Start: 2021-01-07 | End: 2021-11-18 | Stop reason: SDUPTHER

## 2021-01-21 ENCOUNTER — OFFICE VISIT (OUTPATIENT)
Dept: PRIMARY CARE CLINIC | Age: 56
End: 2021-01-21
Payer: COMMERCIAL

## 2021-01-21 DIAGNOSIS — Z20.822 SUSPECTED COVID-19 VIRUS INFECTION: Primary | ICD-10-CM

## 2021-01-21 PROCEDURE — 99211 OFF/OP EST MAY X REQ PHY/QHP: CPT | Performed by: NURSE PRACTITIONER

## 2021-01-21 PROCEDURE — G8428 CUR MEDS NOT DOCUMENT: HCPCS | Performed by: NURSE PRACTITIONER

## 2021-01-21 PROCEDURE — G8417 CALC BMI ABV UP PARAM F/U: HCPCS | Performed by: NURSE PRACTITIONER

## 2021-01-21 NOTE — PROGRESS NOTES
Armando Ferro received a viral test for COVID-19. They were educated on isolation and quarantine as appropriate. For any symptoms, they were directed to seek care from their PCP, given contact information to establish with a doctor, directed to an urgent care or the emergency room.

## 2021-01-22 LAB — SARS-COV-2, NAA: NOT DETECTED

## 2021-03-11 ENCOUNTER — OFFICE VISIT (OUTPATIENT)
Dept: INTERNAL MEDICINE CLINIC | Age: 56
End: 2021-03-11
Payer: COMMERCIAL

## 2021-03-11 VITALS
BODY MASS INDEX: 35.12 KG/M2 | TEMPERATURE: 97.9 F | SYSTOLIC BLOOD PRESSURE: 120 MMHG | HEIGHT: 61 IN | HEART RATE: 64 BPM | WEIGHT: 186 LBS | DIASTOLIC BLOOD PRESSURE: 64 MMHG

## 2021-03-11 DIAGNOSIS — E78.5 DYSLIPIDEMIA: ICD-10-CM

## 2021-03-11 DIAGNOSIS — Z00.00 ANNUAL PHYSICAL EXAM: Primary | ICD-10-CM

## 2021-03-11 DIAGNOSIS — I10 HTN (HYPERTENSION), BENIGN: ICD-10-CM

## 2021-03-11 DIAGNOSIS — R73.03 PREDIABETES: ICD-10-CM

## 2021-03-11 DIAGNOSIS — Z13.220 ENCOUNTER FOR LIPID SCREENING FOR CARDIOVASCULAR DISEASE: ICD-10-CM

## 2021-03-11 DIAGNOSIS — Z13.1 SCREENING FOR DIABETES MELLITUS (DM): ICD-10-CM

## 2021-03-11 DIAGNOSIS — Z13.6 ENCOUNTER FOR LIPID SCREENING FOR CARDIOVASCULAR DISEASE: ICD-10-CM

## 2021-03-11 PROCEDURE — G8482 FLU IMMUNIZE ORDER/ADMIN: HCPCS | Performed by: INTERNAL MEDICINE

## 2021-03-11 PROCEDURE — 99396 PREV VISIT EST AGE 40-64: CPT | Performed by: INTERNAL MEDICINE

## 2021-03-11 ASSESSMENT — PATIENT HEALTH QUESTIONNAIRE - PHQ9
SUM OF ALL RESPONSES TO PHQ QUESTIONS 1-9: 0
1. LITTLE INTEREST OR PLEASURE IN DOING THINGS: 0
SUM OF ALL RESPONSES TO PHQ QUESTIONS 1-9: 0

## 2021-03-11 NOTE — PROGRESS NOTES
History and Physical      Melda Matters  YOB: 1965    Date of Service:  3/11/2021    Vitals:    03/11/21 0904   BP: 120/64   Site: Right Upper Arm   Position: Sitting   Cuff Size: Medium Adult   Pulse: 64   Temp: 97.9 °F (36.6 °C)   Weight: 186 lb (84.4 kg)   Height: 5' 1\" (1.549 m)     Body mass index is 35.14 kg/m². Wt Readings from Last 3 Encounters:   03/11/21 186 lb (84.4 kg)   09/09/20 205 lb (93 kg)   08/06/20 205 lb 12.8 oz (93.4 kg)     BP Readings from Last 3 Encounters:   03/11/21 120/64   09/09/20 108/75   08/06/20 138/84        Chief Gordon Bernard is a 63 y.o. female who presents for complete physical examination. HPI:     Feeling well    The 10-year ASCVD risk score (Arely Delaney., et al., 2013) is: 4.2%    Values used to calculate the score:      Age: 63 years      Sex: Female      Is Non- : Yes      Diabetic: No      Tobacco smoker: No      Systolic Blood Pressure: 636 mmHg      Is BP treated: Yes      HDL Cholesterol: 67 mg/dL      Total Cholesterol: 239 mg/dL      Patient Active Problem List   Diagnosis    Glaucoma    Hypertension, benign    Allergic rhinitis    Mixed hyperlipidemia    History of colon polyps    Morbid obesity due to excess calories (HCC)    Gastroesophageal reflux disease    History of DVT (deep vein thrombosis)    Adenomatous colon polyp    Prediabetes       Allergies   Allergen Reactions    Codeine      Lightheaded    Demerol Itching    Other      Novacaine -TURN  BLUE    Vicodin [Hydrocodone-Acetaminophen]     Vicodin [Hydrocodone-Acetaminophen] Itching and Other (See Comments)     LIGHT HEADED    Penicillins Nausea And Vomiting     Outpatient Medications Marked as Taking for the 3/11/21 encounter (Office Visit) with Kathe Hall MD   Medication Sig Dispense Refill    alum Hydroxide-Mag Carbonate 160-105 MG CHEW Chew 2 tablets every 4 hours as needed for indigestion.       Artificial Tear Ointment (DRY EYES OP) Apply 1 drop to eye 2 times daily       latanoprost (XALATAN) 0.005 % ophthalmic solution Place 1 drop into the right eye nightly.  brimonidine-timolol (COMBIGAN) 0.2-0.5 % ophthalmic solution Place 1 drop into the right eye every 12 hours. 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 Dr. Andreas Jacques. Repeat in 3 years.  COLONOSCOPY  9/9/2020    COLONOSCOPY WITH BIOPSY performed by Gary Jacobs MD at [facility]  2003    Left foot - endoscopic    TONSILLECTOMY AND ADENOIDECTOMY  Child    TUBAL LIGATION       Family History   Problem Relation Age of Onset    Hypertension Mother         DM, Rheumatoid arthritis, CKD    Diabetes Mother     Hypertension Father         Prostate cancer, Hyperlipidemia    Prostate Cancer Father     Prostate Cancer Brother     Other Brother         Healthy    Other Sister         Healthy    Other Sister         Healthy    Breast Cancer Neg Hx     Colon Cancer Neg Hx        Review of Systems:  A comprehensive review of systems was negative except for what was noted in the HPI. Physical Exam  Constitutional:       General: She is not in acute distress. Appearance: Normal appearance. She is well-developed. She is not diaphoretic. HENT:      Head: Normocephalic and atraumatic.       Right Ear: Tympanic membrane, ear canal and external ear normal.      Left Ear: Tympanic membrane, ear canal and external ear normal.      Nose: Nose normal.      Mouth/Throat: negative HPV in 2/2021. Awaiting 1 year follow pu  Self-breast exams: yes  Hx of abnormal mammogram: no  Previous DEXA scan: no  Eye exam within the past 2 years: yes  Dental exam every 6 months: yes  Exercise: Praise dance at [address] Marital status:       Spouse name: None    Number of children: 0    Years of education: 12    Highest education level: None   Occupational History    Occupation:      Comment: [employer]   Social Needs    Financial resource strain: None    Food insecurity     Worry: None     Inability: None    Transportation needs     Medical: None     Non-medical: None   Tobacco Use    Smoking status: Never Smoker    Smokeless tobacco: Never Used   Substance and Sexual Activity    Alcohol use: No    Drug use: No    Sexual activity: Not Currently   Lifestyle    Physical activity     Days per week: None     Minutes per session: None    Stress: None   Relationships    Social connections     Talks on phone: None     Gets together: None     Attends Christianity service: None     Active member of club or organization: None     Attends meetings of clubs or organizations: None     Relationship status: None    Intimate partner violence     Fear of current or ex partner: None     Emotionally abused: None     Physically abused: None     Forced sexual activity: None   Other Topics Concern    None   Social History Narrative    None     Social History     Substance and Sexual Activity   Sexual Activity Not Currently       Advance Directive: N, <no information>    Immunization History   Administered Date(s) Administered    Influenza 11/02/2012, 10/04/2013    Influenza Virus Vaccine 12/23/2014, 12/31/2015    Influenza, MDCK Quadv, IM, PF (Flucelvax 4 yrs and older) 10/24/2019    Influenza, Quadv, IM, PF (6 mo and older Fluzone, Flulaval, Fluarix, and 3 yrs and older Afluria) 01/12/2017, 11/09/2017, 10/25/2018    Influenza, Anuel Ryan, Recombinant, IM PF (Flublok 18 yrs and older) 10/22/2020    Td, unspecified formulation 05/08/2003, 03/24/2008    Tdap (Boostrix, Adacel) 05/31/2013       Health Maintenance   Topic Date Due    COVID-19 Vaccine (1 of 2) Never done    Shingles Vaccine (1 of 2) Never done    A1C test (Diabetic or Prediabetic)  02/06/2021    Potassium monitoring  02/06/2021    Creatinine monitoring  02/06/2021    Cervical cancer screen  12/06/2021    Breast cancer screen  10/29/2022    DTaP/Tdap/Td vaccine (2 - Td) 05/31/2023    Colon cancer screen colonoscopy  09/09/2023    Lipid screen  02/06/2025    Flu vaccine  Completed    Hepatitis C screen  Completed    HIV screen  Completed    Hepatitis A vaccine  Aged Out    Hepatitis B vaccine  Aged Out    Hib vaccine  Aged Out    Meningococcal (ACWY) vaccine  Aged Out    Pneumococcal 0-64 years Vaccine  Aged Out          Assessment/Plan:  1. Annual physical exam  Care gaps identified and addressed  Discussed COVID vaccine - will work on scheduling  UTD on gyn screening  UTD on mammogram  Declines shingrix  Labs ordered for risk stratification  Encouraged lifestyle measures. -     Lipid Panel; Future  -     Hemoglobin A1C; Future  -     Comprehensive Metabolic Panel; Future  2. Encounter for lipid screening for cardiovascular disease  -     Lipid Panel; Future  3. Screening for diabetes mellitus (DM)  -     Hemoglobin A1C; Future  4. Prediabetes  -     Hemoglobin A1C; Future  5. Dyslipidemia  -     Lipid Panel; Future  -     Comprehensive Metabolic Panel; Future  6. Hypertension, benign  -     Comprehensive Metabolic Panel; Future       Return in about 6 months (around 9/11/2021) for chronic conditions.

## 2021-03-25 ENCOUNTER — TELEPHONE (OUTPATIENT)
Dept: INTERNAL MEDICINE CLINIC | Age: 56
End: 2021-03-25

## 2021-03-25 NOTE — TELEPHONE ENCOUNTER
FYI:    Patient states she received a call from Dale to schedule the [place] Vaccination. She wishes to inform Dale that she is scheduled through the [facility] today at [address] to receive this. No need to contact patient.

## 2021-09-16 ENCOUNTER — OFFICE VISIT (OUTPATIENT)
Dept: INTERNAL MEDICINE CLINIC | Age: 56
End: 2021-09-16
Payer: COMMERCIAL

## 2021-09-16 VITALS
SYSTOLIC BLOOD PRESSURE: 130 MMHG | BODY MASS INDEX: 34.69 KG/M2 | WEIGHT: 183.6 LBS | DIASTOLIC BLOOD PRESSURE: 76 MMHG | OXYGEN SATURATION: 99 % | HEART RATE: 66 BPM | RESPIRATION RATE: 14 BRPM

## 2021-09-16 DIAGNOSIS — M25.562 ACUTE PAIN OF LEFT KNEE: Primary | ICD-10-CM

## 2021-09-16 DIAGNOSIS — R73.03 PREDIABETES: ICD-10-CM

## 2021-09-16 DIAGNOSIS — I10 ESSENTIAL HYPERTENSION: ICD-10-CM

## 2021-09-16 DIAGNOSIS — E78.2 MIXED HYPERLIPIDEMIA: ICD-10-CM

## 2021-09-16 DIAGNOSIS — Z23 NEED FOR VACCINATION: ICD-10-CM

## 2021-09-16 PROCEDURE — 99214 OFFICE O/P EST MOD 30 MIN: CPT | Performed by: INTERNAL MEDICINE

## 2021-09-16 PROCEDURE — 1036F TOBACCO NON-USER: CPT | Performed by: INTERNAL MEDICINE

## 2021-09-16 PROCEDURE — G8417 CALC BMI ABV UP PARAM F/U: HCPCS | Performed by: INTERNAL MEDICINE

## 2021-09-16 PROCEDURE — 90471 IMMUNIZATION ADMIN: CPT | Performed by: INTERNAL MEDICINE

## 2021-09-16 PROCEDURE — 3017F COLORECTAL CA SCREEN DOC REV: CPT | Performed by: INTERNAL MEDICINE

## 2021-09-16 PROCEDURE — G8427 DOCREV CUR MEDS BY ELIG CLIN: HCPCS | Performed by: INTERNAL MEDICINE

## 2021-09-16 PROCEDURE — 90674 CCIIV4 VAC NO PRSV 0.5 ML IM: CPT | Performed by: INTERNAL MEDICINE

## 2021-09-16 ASSESSMENT — ENCOUNTER SYMPTOMS
NAUSEA: 0
SHORTNESS OF BREATH: 0
DIARRHEA: 0
CONSTIPATION: 0
ABDOMINAL PAIN: 0
WHEEZING: 0
VOMITING: 0
COUGH: 0

## 2021-09-16 NOTE — ASSESSMENT & PLAN NOTE
Continue efforts at healthy diet, exercise and weight loss.     Lab Results   Component Value Date    LABA1C 6.1 03/11/2021     Lab Results   Component Value Date    .4 03/11/2021

## 2021-09-16 NOTE — PROGRESS NOTES
Fernando Galloway (:  1965) is a 63 y.o. female,Established patient, here for evaluation of the following chief complaint(s):  Follow-up      ASSESSMENT/PLAN:  1. Acute pain of left knee  Comments:  History sounds like may have a small meniscus tear. So much better currently however just 2 weeks later that will rx voltaren gel and have her ice. If no better will refer to ortho for xray, eval, treat. Orders:  -     diclofenac sodium (VOLTAREN) 1 % GEL; Apply 4 g topically 4 times daily, Topical, 4 TIMES DAILY Starting u 2021, Disp-100 g, R-0, Normal  2. Essential hypertension  Assessment & Plan:   Well-controlled, continue current medications, medication adherence emphasized and lifestyle modifications recommended  3. Mixed hyperlipidemia  Assessment & Plan:  At goal for now. Low to intermediate risk. Holding on statin therapy. 4. Prediabetes  Assessment & Plan:   Continue efforts at healthy diet, exercise and weight loss. Lab Results   Component Value Date    LABA1C 6.1 2021     Lab Results   Component Value Date    .4 2021       5. Need for vaccination  -     INFLUENZA, MDCK QUADV, 2 YRS AND OLDER, IM, PF, PREFILL SYR OR SDV, 0.5ML (FLUCELVAX QUADV, PF)      Return in about 6 months (around 3/16/2022) for Physical.    SUBJECTIVE/OBJECTIVE:  HPI    Hurt left knee 2 weeks ago stepping down off of a step at her parents' house. Feels it will pop in and pop out on the lateral side. Feels mostly better. \"100% better\". No more pain. Does still feel the occasional pop out but can touch it and improves immediately.      The 10-year ASCVD risk score (Laura Whitehead, et al., 2013) is: 4.6%    Values used to calculate the score:      Age: 63 years      Sex: Female      Is Non- : Yes      Diabetic: No      Tobacco smoker: No      Systolic Blood Pressure: 858 mmHg      Is BP treated: Yes      HDL Cholesterol: 80 mg/dL      Total Cholesterol: 235 mg/dL      Taking BP meds without issue  Lab Results   Component Value Date     03/11/2021    K 4.3 03/11/2021     03/11/2021    CO2 29 03/11/2021    BUN 13 03/11/2021    CREATININE 0.8 03/11/2021    GLUCOSE 86 03/11/2021    GLUCOSE 86 10/31/2011    CALCIUM 9.6 03/11/2021          Review of Systems   Constitutional: Negative for chills, fatigue and fever. Respiratory: Negative for cough, shortness of breath and wheezing. Cardiovascular: Negative for chest pain, palpitations and leg swelling. Gastrointestinal: Negative for abdominal pain, constipation, diarrhea, nausea and vomiting. Musculoskeletal: Positive for arthralgias. Psychiatric/Behavioral: Negative for dysphoric mood. Current Outpatient Medications on File Prior to Visit   Medication Sig Dispense Refill    lisinopril (PRINIVIL;ZESTRIL) 10 MG tablet TAKE 1 TABLET BY MOUTH EVERY DAY 90 tablet 2    Artificial Tear Ointment (DRY EYES OP) Apply 1 drop to eye 2 times daily       latanoprost (XALATAN) 0.005 % ophthalmic solution Place 1 drop into the right eye nightly.  brimonidine-timolol (COMBIGAN) 0.2-0.5 % ophthalmic solution Place 1 drop into the right eye every 12 hours. No current facility-administered medications on file prior to visit. Vitals:    09/16/21 0823   BP: 130/76   Pulse: 66   Resp: 14   SpO2: 99%     Physical Exam  Constitutional:       General: She is not in acute distress. Appearance: Normal appearance. She is not diaphoretic. HENT:      Head: Normocephalic and atraumatic. Right Ear: External ear normal.      Left Ear: External ear normal.      Nose: Nose normal.      Mouth/Throat:      Mouth: Mucous membranes are moist.      Pharynx: Oropharynx is clear. Eyes:      General: No scleral icterus. Conjunctiva/sclera: Conjunctivae normal.   Cardiovascular:      Rate and Rhythm: Normal rate and regular rhythm. Pulses: Normal pulses. Heart sounds: Normal heart sounds. No murmur heard.    No friction rub. No gallop. Pulmonary:      Effort: Pulmonary effort is normal.      Breath sounds: Normal breath sounds. No wheezing, rhonchi or rales. Abdominal:      General: Abdomen is flat. Bowel sounds are normal.   Musculoskeletal:         General: No swelling, tenderness or deformity. Normal range of motion. Cervical back: Normal range of motion. Right lower leg: No edema. Left lower leg: No edema. Comments: No clicking or locking left knee   Skin:     General: Skin is warm and dry. Findings: No rash. Neurological:      General: No focal deficit present. Mental Status: She is alert. Coordination: Coordination normal.      Gait: Gait normal.   Psychiatric:         Mood and Affect: Mood normal.         Behavior: Behavior normal.           On this date 9/16/2021 I have spent 30 minutes reviewing previous notes, test results and face to face with the patient discussing the diagnosis and importance of compliance with the treatment plan as well as documenting on the day of the visit. An electronic signature was used to authenticate this note.     --Shahid Lee MD

## 2021-11-18 DIAGNOSIS — I10 HTN (HYPERTENSION), BENIGN: ICD-10-CM

## 2021-11-18 RX ORDER — LISINOPRIL 10 MG/1
TABLET ORAL
Qty: 90 TABLET | Refills: 2 | Status: SHIPPED | OUTPATIENT
Start: 2021-11-18 | End: 2022-03-24

## 2021-11-18 NOTE — TELEPHONE ENCOUNTER
----- Message from Manjula Pritchett sent at 11/18/2021  9:36 AM EST -----  Subject: Refill Request    QUESTIONS  Name of Medication? lisinopril (PRINIVIL;ZESTRIL) 10 MG tablet  Patient-reported dosage and instructions? Patient takes one pill once a   day   How many days do you have left? 4  Preferred Pharmacy? [address] [place] [address]  Pharmacy phone number (if available)? [phone]  ---------------------------------------------------------------------------  --------------  Emigdio HARRISON  What is the best way for the office to contact you? OK to leave message on   voicemail  Preferred Call Back Phone Number?  [phone]

## 2021-11-23 ENCOUNTER — TELEPHONE (OUTPATIENT)
Dept: INTERNAL MEDICINE CLINIC | Age: 56
End: 2021-11-23

## 2021-11-23 NOTE — TELEPHONE ENCOUNTER
----- Message from Marisol Cotton sent at 11/23/2021 10:30 AM EST -----  Subject: Message to Provider    QUESTIONS  Information for Provider? Pt calling to leave date of covid vaccine   3/25/21 & 4/21/21.   ---------------------------------------------------------------------------  --------------  CALL BACK INFO  What is the best way for the office to contact you? OK to leave message on   voicemail  Preferred Call Back Phone Number? [phone]  ---------------------------------------------------------------------------  --------------  SCRIPT ANSWERS  Relationship to Patient?  Self

## 2022-02-10 ENCOUNTER — TELEPHONE (OUTPATIENT)
Dept: INTERNAL MEDICINE CLINIC | Age: 57
End: 2022-02-10

## 2022-02-10 NOTE — TELEPHONE ENCOUNTER
----- Message from Romanvictoria Tj sent at 2/10/2022 11:16 AM EST -----  Subject: Appointment Request    Reason for Call: Routine Physical Exam    QUESTIONS  Type of Appointment? Established Patient  Reason for appointment request? Available appointments did not meet   patient need  Additional Information for Provider? pt is requesting to reschedule to   march 10 2022 for physical because she just started a new job and does not   want to miss too much work  ---------------------------------------------------------------------------  --------------  [email or web address]  What is the best way for the office to contact you? OK to leave message on   voicemail  Preferred Call Back Phone Number? [phone]  ---------------------------------------------------------------------------  --------------  SCRIPT ANSWERS  Relationship to Patient? Self  Have your symptoms changed? No  (If the patient has Medicare as their primary insurance coverage ask this   question) Are you requesting a Medicare Annual Wellness Visit? No  (Is the patient requesting a pap smear with their physical exam?)? No  (Is the patient requesting their annual physical and does not need PAP or   AWV per above?)? Yes   Have you been diagnosed with, awaiting test results for, or told that you   are suspected of having COVID-19 (Coronavirus)? (If patient has tested   negative or was tested as a requirement for work, school, or travel and   not based on symptoms, answer no)? No  Within the past two weeks have you developed any of the following symptoms   (answer no if symptoms have been present longer than 2 weeks or began   more than 2 weeks ago)? Fever or Chills, Cough, Shortness of breath or   difficulty breathing, Loss of taste or smell, Sore throat, Nasal   congestion, Sneezing or runny nose, Fatigue or generalized body aches   (answer no if pain is specific to a body part e.g. back pain), Diarrhea,   Headache?  No  Have you had close contact with someone with COVID-19 in the last 14 days? No  (Service Expert  click yes below to proceed with [employer] As Usual   Scheduling)?  Yes

## 2022-03-24 DIAGNOSIS — I10 HTN (HYPERTENSION), BENIGN: ICD-10-CM

## 2022-03-24 RX ORDER — LISINOPRIL 10 MG/1
TABLET ORAL
Qty: 30 TABLET | Refills: 1 | Status: SHIPPED | OUTPATIENT
Start: 2022-03-24 | End: 2022-04-18

## 2022-04-18 DIAGNOSIS — I10 HTN (HYPERTENSION), BENIGN: ICD-10-CM

## 2022-04-18 RX ORDER — LISINOPRIL 10 MG/1
TABLET ORAL
Qty: 30 TABLET | Refills: 1 | Status: SHIPPED | OUTPATIENT
Start: 2022-04-18 | End: 2022-04-25 | Stop reason: SDUPTHER

## 2022-04-25 DIAGNOSIS — I10 HTN (HYPERTENSION), BENIGN: ICD-10-CM

## 2022-04-25 RX ORDER — LISINOPRIL 10 MG/1
10 TABLET ORAL DAILY
Qty: 90 TABLET | Refills: 0 | Status: SHIPPED | OUTPATIENT
Start: 2022-04-25 | End: 2022-06-16 | Stop reason: SDUPTHER

## 2022-06-16 ENCOUNTER — OFFICE VISIT (OUTPATIENT)
Dept: INTERNAL MEDICINE CLINIC | Age: 57
End: 2022-06-16
Payer: COMMERCIAL

## 2022-06-16 VITALS
HEIGHT: 61 IN | WEIGHT: 196 LBS | DIASTOLIC BLOOD PRESSURE: 76 MMHG | RESPIRATION RATE: 14 BRPM | BODY MASS INDEX: 37 KG/M2 | OXYGEN SATURATION: 97 % | SYSTOLIC BLOOD PRESSURE: 112 MMHG | HEART RATE: 72 BPM

## 2022-06-16 DIAGNOSIS — I10 ESSENTIAL HYPERTENSION: ICD-10-CM

## 2022-06-16 DIAGNOSIS — I10 HTN (HYPERTENSION), BENIGN: ICD-10-CM

## 2022-06-16 DIAGNOSIS — E78.2 MIXED HYPERLIPIDEMIA: ICD-10-CM

## 2022-06-16 DIAGNOSIS — E66.01 MORBID OBESITY DUE TO EXCESS CALORIES (HCC): ICD-10-CM

## 2022-06-16 DIAGNOSIS — Z13.6 ENCOUNTER FOR LIPID SCREENING FOR CARDIOVASCULAR DISEASE: ICD-10-CM

## 2022-06-16 DIAGNOSIS — Z00.00 ENCOUNTER FOR WELL ADULT EXAM WITHOUT ABNORMAL FINDINGS: Primary | ICD-10-CM

## 2022-06-16 DIAGNOSIS — Z13.220 ENCOUNTER FOR LIPID SCREENING FOR CARDIOVASCULAR DISEASE: ICD-10-CM

## 2022-06-16 DIAGNOSIS — Z13.1 SCREENING FOR DIABETES MELLITUS (DM): ICD-10-CM

## 2022-06-16 DIAGNOSIS — R73.03 PREDIABETES: ICD-10-CM

## 2022-06-16 PROCEDURE — 99396 PREV VISIT EST AGE 40-64: CPT | Performed by: INTERNAL MEDICINE

## 2022-06-16 PROCEDURE — G0447 BEHAVIOR COUNSEL OBESITY 15M: HCPCS | Performed by: INTERNAL MEDICINE

## 2022-06-16 RX ORDER — LISINOPRIL 10 MG/1
10 TABLET ORAL DAILY
Qty: 90 TABLET | Refills: 3 | Status: SHIPPED | OUTPATIENT
Start: 2022-06-16

## 2022-06-16 ASSESSMENT — PATIENT HEALTH QUESTIONNAIRE - PHQ9
SUM OF ALL RESPONSES TO PHQ9 QUESTIONS 1 & 2: 0
SUM OF ALL RESPONSES TO PHQ QUESTIONS 1-9: 0
1. LITTLE INTEREST OR PLEASURE IN DOING THINGS: 0
2. FEELING DOWN, DEPRESSED OR HOPELESS: 0
SUM OF ALL RESPONSES TO PHQ QUESTIONS 1-9: 0

## 2022-06-16 NOTE — PATIENT INSTRUCTIONS
Eating Healthy Foods: Care Instructions  Your Care Instructions     Eating healthy foods can help lower your risk for disease. Healthy food gives you energy and keeps your heart strong, your brain active, your musclesworking, and your bones strong. A healthy diet includes a variety of foods from the basic food groups: grains, vegetables, fruits, milk and milk products, and meat and beans. Some people may eat more of their favorite foods from only one food group and, as a result, miss getting the nutrients they need. So, it is important to pay attention not only to what you eat but also to what you are missing from your diet. You caneat a healthy, balanced diet by making a few small changes. Follow-up care is a key part of your treatment and safety. Be sure to make and go to all appointments, and call your doctor if you are having problems. It's also a good idea to know your test results and keep alist of the medicines you take. How can you care for yourself at home? Look at what you eat   Keep a food diary for a week or two and record everything you eat or drink. Track the number of servings you eat from each food group.  For a balanced diet every day, eat a variety of:  ? 6 or more ounce-equivalents of grains, such as cereals, breads, crackers, rice, or pasta, every day. An ounce-equivalent is 1 slice of bread, 1 cup of ready-to-eat cereal, or ½ cup of cooked rice, cooked pasta, or cooked cereal.  ? 2½ cups of vegetables, especially:  - Dark-green vegetables such as broccoli and spinach.  - Orange vegetables such as carrots and sweet potatoes. - Dry beans (such as martinez and kidney beans) and peas (such as lentils). ? 2 cups of fresh, frozen, or canned fruit. A small apple or 1 banana or orange equals 1 cup. ? 3 cups of nonfat or low-fat milk, yogurt, or other milk products. ? 5½ ounces of meat and beans, such as chicken, fish, lean meat, beans, nuts, and seeds.  One egg, 1 tablespoon of peanut butter, ½ ounce nuts or seeds, or ¼ cup of cooked beans equals 1 ounce of meat.  Learn how to read food labels for serving sizes and ingredients. Fast-food and convenience-food meals often contain few or no fruits or vegetables. Make sure you eat some fruits and vegetables to make the meal more nutritious.  Look at your food diary. For each food group, add up what you have eaten and then divide the total by the number of days. This will give you an idea of how much you are eating from each food group. See if you can find some ways to change your diet to make it more healthy. Start small   Do not try to make dramatic changes to your diet all at once. You might feel that you are missing out on your favorite foods and then be more likely to fail.  Start slowly, and gradually change your habits. Try some of the following:  ? Use whole wheat bread instead of white bread. ? Use nonfat or low-fat milk instead of whole milk. ? Eat brown rice instead of white rice, and eat whole wheat pasta instead of white-flour pasta. ? Try low-fat cheeses and low-fat yogurt. ? Add more fruits and vegetables to meals and have them for snacks. ? Add lettuce, tomato, cucumber, and onion to sandwiches. ? Add fruit to yogurt and cereal.  Enjoy food   You can still eat your favorite foods. You just may need to eat less of them. If your favorite foods are high in fat, salt, and sugar, limit how often you eat them, but do not cut them out entirely.  Eat a wide variety of foods. Make healthy choices when eating out   The type of restaurant you choose can help you make healthy choices. Even fast-food chains are now offering more low-fat or healthier choices on the menu.  Choose smaller portions, or take half of your meal home.  When eating out, try:  ? A veggie pizza with a whole wheat crust or grilled chicken (instead of sausage or pepperoni).   ? Pasta with roasted vegetables, grilled chicken, or marinara sauce instead of cream sauce. ? A vegetable wrap or grilled chicken wrap. ? Broiled or poached food instead of fried or breaded items. Make healthy choices easy   Buy packaged, prewashed, ready-to-eat fresh vegetables and fruits, such as baby carrots, salad mixes, and chopped or shredded broccoli and cauliflower.  Buy packaged, presliced fruits, such as melon or pineapple.  Choose 100% fruit or vegetable juice instead of soda. Limit juice intake to 4 to 6 oz (½ to ¾ cup) a day.  Blend low-fat yogurt, fruit juice, and canned or frozen fruit to make a smoothie for breakfast or a snack. Where can you learn more? Go to https://SecureAuthpeVouch.Mobile Messenger. org and sign in to your Symptom.ly account. Enter A569 in the [employer] box to learn more about \"Eating Healthy Foods: Care Instructions. \"     If you do not have an account, please click on the \"Sign Up Now\" link. Current as of: September 8, 2021               Content Version: 13.2  © 2126-2638 [employer]. Care instructions adapted under license by [facility] ([facility]). If you have questions about a medical condition or this instruction, always ask your healthcare professional. [place] [address] any warranty or liability for your use of this information. Well Visit, Women 48 to 72: Care Instructions  Overview     Well visits can help you stay healthy. Your doctor has checked your overall health and may have suggested ways to take good care of yourself. Your doctor also may have recommended tests. At home, you can help prevent illness withhealthy eating, regular exercise, and other steps. Follow-up care is a key part of your treatment and safety. Be sure to make and go to all appointments, and call your doctor if you are having problems. It's also a good idea to know your test results and keep alist of the medicines you take. How can you care for yourself at home?  Get screening tests that you and your doctor decide on. Screening helps find diseases before any symptoms appear.  Eat healthy foods. Choose fruits, vegetables, whole grains, protein, and low-fat dairy foods. Limit fat, especially saturated fat. Reduce salt in your diet.  Limit alcohol. Have no more than 1 drink a day or 7 drinks a week.  Get at least 30 minutes of exercise on most days of the week. Walking is a good choice. You also may want to do other activities, such as running, swimming, cycling, or playing tennis or team sports.  Reach and stay at a healthy weight. This will lower your risk for many problems, such as obesity, diabetes, heart disease, and high blood pressure.  Do not smoke. Smoking can make health problems worse. If you need help quitting, talk to your doctor about stop-smoking programs and medicines. These can increase your chances of quitting for good.  Care for your mental health. It is easy to get weighed down by worry and stress. Learn strategies to manage stress, like deep breathing and mindfulness, and stay connected with your family and community. If you find you often feel sad or hopeless, talk with your doctor. Treatment can help.  Talk to your doctor about whether you have any risk factors for sexually transmitted infections (STIs). You can help prevent STIs if you wait to have sex with a new partner (or partners) until you've each been tested for STIs. It also helps if you use condoms (male or female condoms) and if you limit your sex partners to one person who only has sex with you. Vaccines are available for some STIs.  If you think you may have a problem with alcohol or drug use, talk to your doctor. This includes prescription medicines (such as amphetamines and opioids) and illegal drugs (such as cocaine and methamphetamine). Your doctor can help you figure out what type of treatment is best for you.  Protect your skin from too much sun.  When you're outdoors from 10 a.m. to 4 p.m., stay in the shade or cover up with clothing and a hat with a wide brim. Wear sunglasses that block UV rays. Even when it's cloudy, put broad-spectrum sunscreen (SPF 30 or higher) on any exposed skin.  See a dentist one or two times a year for checkups and to have your teeth cleaned.  Wear a seat belt in the car. When should you call for help? Watch closely for changes in your health, and be sure to contact your doctor if you have any problems or symptoms that concern you. Where can you learn more? Go to https://1-800-DOCTORSpeAsteriskeb."[employer]. org and sign in to your M[employer] jobs account. Enter Q922 in the [employer]oL Meds box to learn more about \"Well Visit, Women 50 to 72: Care Instructions. \"     If you do not have an account, please click on the \"Sign Up Now\" link. Current as of: October 6, 2021               Content Version: 13.2  © 8784-7290 [employer]. Care instructions adapted under license by [facility] ([facility]). If you have questions about a medical condition or this instruction, always ask your healthcare professional. [place] [address] any warranty or liability for your use of this information.       Would get COVID booster

## 2022-06-16 NOTE — PROGRESS NOTES
History and Physical      Jeet Sam  YOB: 1965    Date of Service:  6/16/2022    Vitals:    06/16/22 0922   BP: 112/76   Pulse: 72   Resp: 14   SpO2: 97%   Weight: 196 lb (88.9 kg)   Height: 5' 1\" (1.549 m)     Body mass index is 37.03 kg/m². Wt Readings from Last 3 Encounters:   06/16/22 196 lb (88.9 kg)   09/16/21 183 lb 9.6 oz (83.3 kg)   03/11/21 186 lb (84.4 kg)     BP Readings from Last 3 Encounters:   06/16/22 112/76   09/16/21 130/76   03/11/21 120/64        Chief Lv Enamorado is a 61 y.o. female who presents for complete physical examination. HPI:   Overall feeling well    Patient Active Problem List   Diagnosis    Glaucoma    Essential hypertension    Allergic rhinitis    Mixed hyperlipidemia    History of colon polyps    Morbid obesity due to excess calories (HCC)    Gastroesophageal reflux disease    History of DVT (deep vein thrombosis)    Adenomatous colon polyp    Prediabetes       Allergies   Allergen Reactions    Codeine      Lightheaded    Demerol Itching    Other      Novacaine -TURN  BLUE    Vicodin [Hydrocodone-Acetaminophen]     Vicodin [Hydrocodone-Acetaminophen] Itching and Other (See Comments)     LIGHT HEADED    Penicillins Nausea And Vomiting     Outpatient Medications Marked as Taking for the 6/16/22 encounter (Office Visit) with Torrie Cantrell MD   Medication Sig Dispense Refill    lisinopril (PRINIVIL;ZESTRIL) 10 MG tablet Take 1 tablet by mouth daily 90 tablet 0    diclofenac sodium (VOLTAREN) 1 % GEL Apply 4 g topically 4 times daily 100 g 0    Artificial Tear Ointment (DRY EYES OP) Apply 1 drop to eye 2 times daily       latanoprost (XALATAN) 0.005 % ophthalmic solution Place 1 drop into the right eye nightly.  brimonidine-timolol (COMBIGAN) 0.2-0.5 % ophthalmic solution Place 1 drop into the right eye every 12 hours.          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 Dr. Boubacar Stein. Repeat in 3 years.  COLONOSCOPY  9/9/2020    COLONOSCOPY WITH BIOPSY performed by Destinee Osorio MD at [facility]  2003    Left foot - endoscopic    TONSILLECTOMY AND ADENOIDECTOMY  Child    TUBAL LIGATION       Family History   Problem Relation Age of Onset    Hypertension Mother         DM, Rheumatoid arthritis, CKD    Diabetes Mother     Hypertension Father         Prostate cancer, Hyperlipidemia    Prostate Cancer Father     Prostate Cancer Brother     Other Brother         Healthy    Other Sister         Healthy    Other Sister         Healthy    Breast Cancer Neg Hx     Colon Cancer Neg Hx        Review of Systems:  A comprehensive review of systems was negative except for what was noted in the HPI. Physical Exam  Constitutional:       General: She is not in acute distress. Appearance: Normal appearance. She is well-developed. She is not diaphoretic. HENT:      Head: Normocephalic and atraumatic. Right Ear: Tympanic membrane, ear canal and external ear normal.      Left Ear: Tympanic membrane, ear canal and external ear normal.      Nose: Nose normal.      Mouth/Throat:      Mouth: Mucous membranes are moist.      Pharynx: Oropharynx is clear. No oropharyngeal exudate or posterior oropharyngeal erythema. Eyes:      General: No scleral icterus. Conjunctiva/sclera: Conjunctivae normal.      Pupils: Pupils are equal, round, and reactive to light. Neck:      Thyroid: No thyromegaly. Vascular: No carotid bruit or JVD. Comments: No JVD at 90 deg  No thyromegaly  Cardiovascular:      Rate and Rhythm: Normal rate and regular rhythm. Pulses: Normal pulses. Heart sounds: Normal heart sounds. No murmur heard. No friction rub. No gallop. Pulmonary:      Effort: Pulmonary effort is normal.      Breath sounds: Normal breath sounds. No wheezing or rales. Abdominal:      General: Bowel sounds are normal. There is no distension. Palpations: Abdomen is soft. There is no mass. Tenderness: There is no abdominal tenderness. There is no guarding or rebound. Musculoskeletal:         General: No tenderness or deformity. Normal range of motion. Cervical back: Normal range of motion and neck supple. Right lower leg: No edema. Left lower leg: No edema. Lymphadenopathy:      Cervical: No cervical adenopathy. Skin:     General: Skin is warm and dry. Findings: No lesion or rash. Neurological:      General: No focal deficit present. Mental Status: She is alert and oriented to person, place, and time. Cranial Nerves: No cranial nerve deficit (grossly intact). Sensory: No sensory deficit. Motor: No weakness. Coordination: Coordination normal.      Gait: Gait normal.   Psychiatric:         Mood and Affect: Mood normal.         Behavior: Behavior normal.          Lipid panel:  Lab Results   Component Value Date    CHOL 235 (H) 03/11/2021    TRIG 108 03/11/2021    HDL 80 (H) 03/11/2021    LDLCALC 133 (H) 03/11/2021     Glucose:   Glucose (mg/dL)   Date Value   03/11/2021 86   10/31/2011 86       No results found for this visit on 06/16/22. Preventive Care:  Hx abnormal PAP: yes - 2 years ago - ASCUS with positive HPV  Self-breast exams: yes  Hx of abnormal mammogram: no  Previous DEXA scan: no  Eye exam within the past 2 years: yes  Dental exam every 6 months: yes  Exercise: praise dancing sometimes.  Has fallen off lately  Social History     Socioeconomic History    Marital status:      Spouse name: None    Number of children: 0    Years of education: 12    Highest education level: None   Occupational History    Occupation:      Comment: [employer]   Tobacco Use    Smoking status: Never Smoker    Smokeless tobacco: Never Used   Substance and Sexual Activity    Alcohol use: No    Drug use: No    Sexual activity: Not Currently   Other Topics Concern    None   Social History Narrative    None     Social Determinants of Health     Financial Resource Strain:     Difficulty of Paying Living Expenses: Not on file   Food Insecurity:     Worried About Running Out of Food in the Last Year: Not on file    Jonah of Food in the Last Year: Not on file   Transportation Needs:     Lack of Transportation (Medical): Not on file    Lack of Transportation (Non-Medical):  Not on file   Physical Activity:     Days of Exercise per Week: Not on file    Minutes of Exercise per Session: Not on file   Stress:     Feeling of Stress : Not on file   Social Connections:     Frequency of Communication with Friends and Family: Not on file    Frequency of Social Gatherings with Friends and Family: Not on file    Attends Bahai Services: Not on file    Active Member of [address] [place] [address] or Organizations: Not on file    Attends Club or Organization Meetings: Not on file    Marital Status: Not on file   Intimate Partner Violence:     Fear of Current or Ex-Partner: Not on file    Emotionally Abused: Not on file    Physically Abused: Not on file    Sexually Abused: Not on file   Housing Stability:     Unable to Pay for Housing in the Last Year: Not on file    Number of [place] in the Last Year: Not on file    Unstable Housing in the Last Year: Not on file     Social History     Substance and Sexual Activity   Sexual Activity Not Currently       Advance Directive: N, <no information>    Immunization History   Administered Date(s) Administered    COVID-19, Pfizer Purple top, DILUTE for use, 12+ yrs, 30mcg/0.3mL dose 03/25/2021, 04/21/2021    Influenza 11/02/2012, 10/04/2013    Influenza Virus Vaccine 12/23/2014, 12/31/2015    Influenza, MDCK Quadv, IM, PF (Flucelvax 2 yrs and older) 10/24/2019, 09/16/2021    Influenza, Moon Hamburger, IM, PF (6 mo and older Fluzone, Flulaval, Fluarix, and 3 yrs and older Afluria) 01/12/2017, 11/09/2017, 10/25/2018    Influenza, Moon Hamburger, Recombinant, IM PF (Flublok 18 yrs and older) 10/22/2020    Td, unspecified formulation 05/08/2003, 03/24/2008    Tdap (Boostrix, Adacel) 05/31/2013       Health Maintenance   Topic Date Due    Shingles vaccine (1 of 2) Never done    COVID-19 Vaccine (3 - Booster for Pfizer series) 09/21/2021    A1C test (Diabetic or Prediabetic)  03/11/2022    Depression Screen  03/11/2022    DTaP/Tdap/Td vaccine (2 - Td or Tdap) 05/31/2023    Cervical cancer screen  12/06/2023    Breast cancer screen  01/27/2024    Colorectal Cancer Screen  09/09/2025    Lipids  03/11/2026    Flu vaccine  Completed    Hepatitis C screen  Completed    HIV screen  Completed    Hepatitis A vaccine  Aged Out    Hepatitis B vaccine  Aged Out    Hib vaccine  Aged Out    Meningococcal (ACWY) vaccine  Aged Out    Pneumococcal 0-64 years Vaccine  Aged Out          Assessment/Plan:  1. Encounter for well adult exam without abnormal findings  Care gaps identified and addressed  Labs as below  UTD on cancer screening - mammo, pap, colonoscopy  Declines shingrix for now  Will get COVID booster prior to [place] cruise in September  [address] and exercise  2. Hypertension, benign  -     lisinopril (PRINIVIL;ZESTRIL) 10 MG tablet; Take 1 tablet by mouth daily, Disp-90 tablet, R-3Print  -     Comprehensive Metabolic Panel; Future  3. Mixed hyperlipidemia  -     Lipid Panel; Future  -     Comprehensive Metabolic Panel; Future  4. Essential hypertension  -     Comprehensive Metabolic Panel; Future  5.  Prediabetes  -     Hemoglobin A1C; Future  6. Screening for diabetes mellitus (DM)  -     Hemoglobin A1C; Future  7. Encounter for lipid screening for cardiovascular disease  -     Lipid Panel; Future  8. Morbid obesity due to excess calories ([facility] [address]  -     MS Behavior  obesity 15m []       Return in about 6 months (around 12/16/2022) for chronic conditions. Obesity Counseling: Assessed behavioral health risks and factors affecting choice of behavior. Suggested weight control approaches, including dietary changes behavioral modification and follow up plan. Provided educational and support documentation.   Time spent (minutes): 9 numerical 0-10

## 2022-08-08 ENCOUNTER — TELEPHONE (OUTPATIENT)
Dept: INTERNAL MEDICINE CLINIC | Age: 57
End: 2022-08-08

## 2022-08-08 NOTE — TELEPHONE ENCOUNTER
Patient has been scheduled for Covid test as she will be going on a cruise. I spoke with patient and asked that she come in on Thursday instead of Friday so she will be able to  the results on Friday. She will come in on Thursday 9/1/22.  Also she called to advise us she received the COVID booster on 8/6/22

## 2022-08-08 NOTE — TELEPHONE ENCOUNTER
----- Message from [facility] sent at 8/8/2022  8:41 AM EDT -----  Subject: Message to Provider    QUESTIONS  Information for Provider? wanted dr Lori Wiseman to get the booster COVID #1 for   her trip coming up will also be coming on Sept 2nd to be tested prior to   trip  ---------------------------------------------------------------------------  --------------  [id number] [employer]  [phone]; OK to leave message on voicemail  ---------------------------------------------------------------------------  --------------  SCRIPT ANSWERS  Relationship to Patient?  Self

## 2022-11-02 ENCOUNTER — TELEPHONE (OUTPATIENT)
Dept: INTERNAL MEDICINE CLINIC | Age: 57
End: 2022-11-02

## 2022-11-02 NOTE — TELEPHONE ENCOUNTER
Patient fell today at work due to missing a step and landed on her left side. Patient states she is OK, but her tailbone is a little sore and her shoulder is red and sore. She is wondering if there is anything she can do to prevent further soreness. Patient is wondering if she should ice her arm? Please contact to advise. Aware Dr. Wong Hernández and MA are out of office today.

## 2022-11-02 NOTE — TELEPHONE ENCOUNTER
Patient returned MA call and was given the following message:  \"Pt will need to contact someone for Workers Comp for her fall at work \"       Patient understood. She would just like to know if she should be icing her shoulder. Please advise when possible.

## 2022-11-02 NOTE — TELEPHONE ENCOUNTER
Left a voicemail for a return call to advise her that I sent a message to Dr. Trenton Lim in ref to her fall at work today .  Pt will need to contact someone for Workers Comp for her fall at work

## 2022-11-03 NOTE — TELEPHONE ENCOUNTER
Left a voicemail to advise Pt  She can take some aleve 1 or 2 tabs twice a day and ice the shoulder per Dr. Esme Pendleton

## 2022-12-15 ENCOUNTER — TELEPHONE (OUTPATIENT)
Dept: INTERNAL MEDICINE CLINIC | Age: 57
End: 2022-12-15

## 2022-12-15 NOTE — TELEPHONE ENCOUNTER
----- Message from Martha Doty sent at 12/15/2022 10:40 AM EST -----  Subject: Message to Provider    QUESTIONS  Information for Provider? Patient just started a new job and isn't sure if   she will still have insurance on the 19th. Please advise.   ---------------------------------------------------------------------------  --------------  Indra Martinez XWXK  [phone]; OK to leave message on voicemail  ---------------------------------------------------------------------------  --------------  SCRIPT ANSWERS  Relationship to Patient?  Self

## 2023-03-13 ENCOUNTER — TELEPHONE (OUTPATIENT)
Dept: INTERNAL MEDICINE CLINIC | Age: 58
End: 2023-03-13

## 2023-03-13 NOTE — TELEPHONE ENCOUNTER
----- Message from Rachel Nunez sent at 3/13/2023  3:07 PM EDT -----  Subject: Message to Provider    QUESTIONS  Information for Provider? Patient is calling about some Social Security   Disability paperwork that she was told had been faxed to your office. Did   you receive anything? Can you please call her back to update her?  ---------------------------------------------------------------------------  --------------  Miko Briseno INFO  [phone]; OK to leave message on voicemail  ---------------------------------------------------------------------------  --------------  SCRIPT ANSWERS  Relationship to Patient?  Self

## 2023-03-16 NOTE — TELEPHONE ENCOUNTER
Patient called in for an updated on this form. She stated that we should receive this form either today or Friday. Please contact patient when we have received, signed and faxed this form.       AL

## 2023-05-10 ENCOUNTER — TELEPHONE (OUTPATIENT)
Dept: INTERNAL MEDICINE CLINIC | Age: 58
End: 2023-05-10

## 2023-05-10 NOTE — TELEPHONE ENCOUNTER
----- Message from Barb Briceño sent at 5/10/2023  1:06 PM EDT -----  Subject: Appointment Request    Reason for Call: Established Patient Appointment needed: Routine Physical   Exam    QUESTIONS    Reason for appointment request? No appointments available during search     Additional Information for Provider?  AWV for pt needs nalini in the morning   in august please call if any openings or cancellations   ---------------------------------------------------------------------------  --------------  [address]  [phone]; OK to leave message on voicemail  ---------------------------------------------------------------------------  --------------  SCRIPT ANSWERS  COVID Screen: Perry Canales

## 2023-07-08 DIAGNOSIS — I10 HTN (HYPERTENSION), BENIGN: ICD-10-CM

## 2023-07-10 RX ORDER — LISINOPRIL 10 MG/1
TABLET ORAL
Qty: 30 TABLET | Refills: 5 | Status: SHIPPED | OUTPATIENT
Start: 2023-07-10

## 2023-08-15 ENCOUNTER — OFFICE VISIT (OUTPATIENT)
Dept: INTERNAL MEDICINE CLINIC | Age: 58
End: 2023-08-15
Payer: COMMERCIAL

## 2023-08-15 VITALS
HEART RATE: 68 BPM | WEIGHT: 183 LBS | HEIGHT: 61 IN | BODY MASS INDEX: 34.55 KG/M2 | OXYGEN SATURATION: 98 % | DIASTOLIC BLOOD PRESSURE: 74 MMHG | SYSTOLIC BLOOD PRESSURE: 118 MMHG

## 2023-08-15 DIAGNOSIS — Z00.00 ENCOUNTER FOR WELL ADULT EXAM WITHOUT ABNORMAL FINDINGS: ICD-10-CM

## 2023-08-15 DIAGNOSIS — Z13.1 SCREENING FOR DIABETES MELLITUS (DM): ICD-10-CM

## 2023-08-15 DIAGNOSIS — I10 ESSENTIAL HYPERTENSION: ICD-10-CM

## 2023-08-15 DIAGNOSIS — Z13.6 ENCOUNTER FOR LIPID SCREENING FOR CARDIOVASCULAR DISEASE: ICD-10-CM

## 2023-08-15 DIAGNOSIS — Z13.220 ENCOUNTER FOR LIPID SCREENING FOR CARDIOVASCULAR DISEASE: ICD-10-CM

## 2023-08-15 DIAGNOSIS — Z00.00 ENCOUNTER FOR WELL ADULT EXAM WITHOUT ABNORMAL FINDINGS: Primary | ICD-10-CM

## 2023-08-15 DIAGNOSIS — Z23 NEED FOR VACCINATION: ICD-10-CM

## 2023-08-15 LAB
ALBUMIN SERPL-MCNC: 4.1 G/DL (ref 3.4–5)
ALBUMIN/GLOB SERPL: 1.4 {RATIO} (ref 1.1–2.2)
ALP SERPL-CCNC: 93 U/L (ref 40–129)
ALT SERPL-CCNC: 11 U/L (ref 10–40)
ANION GAP SERPL CALCULATED.3IONS-SCNC: 13 MMOL/L (ref 3–16)
AST SERPL-CCNC: 14 U/L (ref 15–37)
BASOPHILS # BLD: 0 K/UL (ref 0–0.2)
BASOPHILS NFR BLD: 0.8 %
BILIRUB SERPL-MCNC: <0.2 MG/DL (ref 0–1)
BUN SERPL-MCNC: 18 MG/DL (ref 7–20)
CALCIUM SERPL-MCNC: 9.5 MG/DL (ref 8.3–10.6)
CHLORIDE SERPL-SCNC: 100 MMOL/L (ref 99–110)
CHOLEST SERPL-MCNC: 229 MG/DL (ref 0–199)
CO2 SERPL-SCNC: 25 MMOL/L (ref 21–32)
CREAT SERPL-MCNC: 0.9 MG/DL (ref 0.6–1.1)
DEPRECATED RDW RBC AUTO: 14.8 % (ref 12.4–15.4)
EOSINOPHIL # BLD: 0.2 K/UL (ref 0–0.6)
EOSINOPHIL NFR BLD: 3.8 %
GFR SERPLBLD CREATININE-BSD FMLA CKD-EPI: >60 ML/MIN/{1.73_M2}
GLUCOSE SERPL-MCNC: 89 MG/DL (ref 70–99)
HCT VFR BLD AUTO: 38 % (ref 36–48)
HDLC SERPL-MCNC: 69 MG/DL (ref 40–60)
HGB BLD-MCNC: 12.8 G/DL (ref 12–16)
LDLC SERPL CALC-MCNC: 136 MG/DL
LYMPHOCYTES # BLD: 2 K/UL (ref 1–5.1)
LYMPHOCYTES NFR BLD: 34.9 %
MCH RBC QN AUTO: 28.6 PG (ref 26–34)
MCHC RBC AUTO-ENTMCNC: 33.6 G/DL (ref 31–36)
MCV RBC AUTO: 85.1 FL (ref 80–100)
MONOCYTES # BLD: 0.6 K/UL (ref 0–1.3)
MONOCYTES NFR BLD: 9.9 %
NEUTROPHILS # BLD: 2.9 K/UL (ref 1.7–7.7)
NEUTROPHILS NFR BLD: 50.6 %
PLATELET # BLD AUTO: 283 K/UL (ref 135–450)
PMV BLD AUTO: 8.3 FL (ref 5–10.5)
POTASSIUM SERPL-SCNC: 4.3 MMOL/L (ref 3.5–5.1)
PROT SERPL-MCNC: 7 G/DL (ref 6.4–8.2)
RBC # BLD AUTO: 4.47 M/UL (ref 4–5.2)
SODIUM SERPL-SCNC: 138 MMOL/L (ref 136–145)
TRIGL SERPL-MCNC: 122 MG/DL (ref 0–150)
VLDLC SERPL CALC-MCNC: 24 MG/DL
WBC # BLD AUTO: 5.7 K/UL (ref 4–11)

## 2023-08-15 PROCEDURE — 99396 PREV VISIT EST AGE 40-64: CPT | Performed by: INTERNAL MEDICINE

## 2023-08-15 PROCEDURE — 3074F SYST BP LT 130 MM HG: CPT | Performed by: INTERNAL MEDICINE

## 2023-08-15 PROCEDURE — 90715 TDAP VACCINE 7 YRS/> IM: CPT | Performed by: INTERNAL MEDICINE

## 2023-08-15 PROCEDURE — 90471 IMMUNIZATION ADMIN: CPT | Performed by: INTERNAL MEDICINE

## 2023-08-15 PROCEDURE — 3078F DIAST BP <80 MM HG: CPT | Performed by: INTERNAL MEDICINE

## 2023-08-15 SDOH — ECONOMIC STABILITY: FOOD INSECURITY: WITHIN THE PAST 12 MONTHS, THE FOOD YOU BOUGHT JUST DIDN'T LAST AND YOU DIDN'T HAVE MONEY TO GET MORE.: NEVER TRUE

## 2023-08-15 SDOH — ECONOMIC STABILITY: FOOD INSECURITY: WITHIN THE PAST 12 MONTHS, YOU WORRIED THAT YOUR FOOD WOULD RUN OUT BEFORE YOU GOT MONEY TO BUY MORE.: NEVER TRUE

## 2023-08-15 SDOH — ECONOMIC STABILITY: HOUSING INSECURITY
IN THE LAST 12 MONTHS, WAS THERE A TIME WHEN YOU DID NOT HAVE A STEADY PLACE TO SLEEP OR SLEPT IN A SHELTER (INCLUDING NOW)?: NO

## 2023-08-15 SDOH — ECONOMIC STABILITY: INCOME INSECURITY: HOW HARD IS IT FOR YOU TO PAY FOR THE VERY BASICS LIKE FOOD, HOUSING, MEDICAL CARE, AND HEATING?: NOT HARD AT ALL

## 2023-08-15 ASSESSMENT — PATIENT HEALTH QUESTIONNAIRE - PHQ9
SUM OF ALL RESPONSES TO PHQ QUESTIONS 1-9: 0
SUM OF ALL RESPONSES TO PHQ QUESTIONS 1-9: 0
1. LITTLE INTEREST OR PLEASURE IN DOING THINGS: 0
SUM OF ALL RESPONSES TO PHQ QUESTIONS 1-9: 0
2. FEELING DOWN, DEPRESSED OR HOPELESS: 0
SUM OF ALL RESPONSES TO PHQ QUESTIONS 1-9: 0
SUM OF ALL RESPONSES TO PHQ9 QUESTIONS 1 & 2: 0

## 2023-08-15 NOTE — PROGRESS NOTES
and reactive to light. Neck:      Thyroid: No thyromegaly. Vascular: No carotid bruit or JVD. Comments: No JVD at 90 deg  No thyromegaly  Cardiovascular:      Rate and Rhythm: Normal rate and regular rhythm. Pulses: Normal pulses. Heart sounds: Normal heart sounds. No murmur heard. No friction rub. No gallop. Pulmonary:      Effort: Pulmonary effort is normal.      Breath sounds: Normal breath sounds. No wheezing or rales. Abdominal:      General: Bowel sounds are normal. There is no distension. Palpations: Abdomen is soft. There is no mass. Tenderness: There is no abdominal tenderness. There is no guarding or rebound. Musculoskeletal:         General: No tenderness or deformity. Normal range of motion. Cervical back: Normal range of motion and neck supple. Right lower leg: No edema. Left lower leg: No edema. Lymphadenopathy:      Cervical: No cervical adenopathy. Skin:     General: Skin is warm and dry. Findings: No lesion or rash. Neurological:      General: No focal deficit present. Mental Status: She is alert and oriented to person, place, and time. Cranial Nerves: No cranial nerve deficit (grossly intact). Sensory: No sensory deficit. Motor: No weakness. Coordination: Coordination normal.      Gait: Gait normal.   Psychiatric:         Mood and Affect: Mood normal.         Behavior: Behavior normal.        Lipid panel:  Lab Results   Component Value Date    CHOL 242 (H) 06/16/2022    TRIG 177 (H) 06/16/2022    HDL 68 (H) 06/16/2022    LDLCALC 139 (H) 06/16/2022     Glucose:   Glucose (mg/dL)   Date Value   06/16/2022 91   10/31/2011 86       No results found for this visit on 08/15/23.      Preventive Care:  Hx abnormal PAP: no  Self-breast exams: yes  Hx of abnormal mammogram: no  Previous DEXA scan: no  Eye exam within the past 2 years: yes  Dental exam every 6 months: yes  Exercise:  very active at work - working in the

## 2023-08-16 LAB
EST. AVERAGE GLUCOSE BLD GHB EST-MCNC: 125.5 MG/DL
HBA1C MFR BLD: 6 %

## 2023-08-22 ENCOUNTER — TELEPHONE (OUTPATIENT)
Dept: INTERNAL MEDICINE CLINIC | Age: 58
End: 2023-08-22

## 2023-08-22 NOTE — TELEPHONE ENCOUNTER
Patient is calling back for results gave patient Dr. Lance Beaulieu note:   \"Please call patient -   Labs looks completely steady from last year. Cholesterol mildly elevated but not at level needs medication. Prediabetes present. Healthy diet and exercise. Kidneys look good. Blood counts look good. \"    Patient states that she will try to cut back on her snacks. And moving around a little bit more.    Patient advised

## 2023-09-21 ENCOUNTER — OFFICE VISIT (OUTPATIENT)
Dept: GYNECOLOGY | Age: 58
End: 2023-09-21
Payer: COMMERCIAL

## 2023-09-21 ENCOUNTER — NURSE ONLY (OUTPATIENT)
Dept: INTERNAL MEDICINE CLINIC | Age: 58
End: 2023-09-21
Payer: COMMERCIAL

## 2023-09-21 VITALS
OXYGEN SATURATION: 98 % | DIASTOLIC BLOOD PRESSURE: 73 MMHG | BODY MASS INDEX: 35.23 KG/M2 | HEIGHT: 61 IN | RESPIRATION RATE: 17 BRPM | SYSTOLIC BLOOD PRESSURE: 116 MMHG | HEART RATE: 68 BPM | WEIGHT: 186.6 LBS

## 2023-09-21 DIAGNOSIS — Z01.419 WELL WOMAN EXAM WITH ROUTINE GYNECOLOGICAL EXAM: Primary | ICD-10-CM

## 2023-09-21 DIAGNOSIS — Z23 NEED FOR INFLUENZA VACCINATION: Primary | ICD-10-CM

## 2023-09-21 PROCEDURE — 3078F DIAST BP <80 MM HG: CPT | Performed by: OBSTETRICS & GYNECOLOGY

## 2023-09-21 PROCEDURE — 3074F SYST BP LT 130 MM HG: CPT | Performed by: OBSTETRICS & GYNECOLOGY

## 2023-09-21 PROCEDURE — 90471 IMMUNIZATION ADMIN: CPT | Performed by: INTERNAL MEDICINE

## 2023-09-21 PROCEDURE — 90674 CCIIV4 VAC NO PRSV 0.5 ML IM: CPT | Performed by: INTERNAL MEDICINE

## 2023-09-21 PROCEDURE — 99386 PREV VISIT NEW AGE 40-64: CPT | Performed by: OBSTETRICS & GYNECOLOGY

## 2023-09-23 ASSESSMENT — ENCOUNTER SYMPTOMS
GASTROINTESTINAL NEGATIVE: 1
EYES NEGATIVE: 1
ALLERGIC/IMMUNOLOGIC NEGATIVE: 1
RESPIRATORY NEGATIVE: 1

## 2024-01-03 DIAGNOSIS — I10 HTN (HYPERTENSION), BENIGN: ICD-10-CM

## 2024-01-03 RX ORDER — LISINOPRIL 10 MG/1
10 TABLET ORAL DAILY
Qty: 90 TABLET | Refills: 1 | Status: SHIPPED | OUTPATIENT
Start: 2024-01-03

## 2024-03-11 ENCOUNTER — TELEPHONE (OUTPATIENT)
Dept: INTERNAL MEDICINE CLINIC | Age: 59
End: 2024-03-11

## 2024-03-11 DIAGNOSIS — I10 HTN (HYPERTENSION), BENIGN: ICD-10-CM

## 2024-03-11 NOTE — TELEPHONE ENCOUNTER
[facility] for patient to call back.  Per Malini:  Unfortunately, Dr. Diallo does not have any availability between now and the time she leaves the practice.  Patient will need to schedule with her new provider.      ----- Message from Karen Latham sent at 3/11/2024  8:13 AM EDT -----  Subject: Appointment Request    Reason for Call: Established Patient Appointment needed: Routine Existing   Condition Follow Up    QUESTIONS    Reason for appointment request? No appointments available during search     Additional Information for Provider? Pt needs an Appt. for routine   exisiting cond. BP 6 month F/U Pt will need refills . Pt will need to set   up her annual physical .  ---------------------------------------------------------------------------  --------------  CALL BACK INFO  [phone]; OK to leave message on voicemail  ---------------------------------------------------------------------------  --------------  SCRIPT ANSWERS

## 2024-03-11 NOTE — TELEPHONE ENCOUNTER
Patient is calling in for  in regards to medication refill for :    lisinopril (PRINIVIL;ZESTRIL) 10 MG tablet   Last appointment: 8/15/2023  Next appointment: 4/17/2024  Last refill:1/3/24        Please send medication refill to :    [facility] [id number] [facility] [address] [facility] - P [phone] - F [phone] [address] [place] [id number]  Phone: [phone]  Fax: [phone]

## 2024-04-17 ENCOUNTER — OFFICE VISIT (OUTPATIENT)
Dept: INTERNAL MEDICINE CLINIC | Age: 59
End: 2024-04-17
Payer: COMMERCIAL

## 2024-04-17 VITALS
DIASTOLIC BLOOD PRESSURE: 78 MMHG | WEIGHT: 190 LBS | OXYGEN SATURATION: 95 % | HEART RATE: 72 BPM | TEMPERATURE: 97.2 F | BODY MASS INDEX: 35.87 KG/M2 | SYSTOLIC BLOOD PRESSURE: 128 MMHG | HEIGHT: 61 IN

## 2024-04-17 DIAGNOSIS — E78.2 MIXED HYPERLIPIDEMIA: Primary | ICD-10-CM

## 2024-04-17 DIAGNOSIS — R73.03 PREDIABETES: ICD-10-CM

## 2024-04-17 DIAGNOSIS — H40.9 GLAUCOMA, UNSPECIFIED GLAUCOMA TYPE, UNSPECIFIED LATERALITY: ICD-10-CM

## 2024-04-17 DIAGNOSIS — I10 ESSENTIAL HYPERTENSION: ICD-10-CM

## 2024-04-17 DIAGNOSIS — I10 HTN (HYPERTENSION), BENIGN: ICD-10-CM

## 2024-04-17 PROCEDURE — 3074F SYST BP LT 130 MM HG: CPT | Performed by: INTERNAL MEDICINE

## 2024-04-17 PROCEDURE — 3078F DIAST BP <80 MM HG: CPT | Performed by: INTERNAL MEDICINE

## 2024-04-17 PROCEDURE — 99214 OFFICE O/P EST MOD 30 MIN: CPT | Performed by: INTERNAL MEDICINE

## 2024-04-17 RX ORDER — LISINOPRIL 10 MG/1
10 TABLET ORAL DAILY
Qty: 90 TABLET | Refills: 1 | Status: SHIPPED | OUTPATIENT
Start: 2024-04-17

## 2024-04-17 ASSESSMENT — PATIENT HEALTH QUESTIONNAIRE - PHQ9
SUM OF ALL RESPONSES TO PHQ QUESTIONS 1-9: 0
1. LITTLE INTEREST OR PLEASURE IN DOING THINGS: NOT AT ALL
SUM OF ALL RESPONSES TO PHQ QUESTIONS 1-9: 0
SUM OF ALL RESPONSES TO PHQ9 QUESTIONS 1 & 2: 0
2. FEELING DOWN, DEPRESSED OR HOPELESS: NOT AT ALL

## 2024-04-17 NOTE — PROGRESS NOTES
Kristin Agee (:  1965) is a 58 y.o. female,Established patient, here for evaluation of the following chief complaint(s):  6 Month Follow-Up (Chronic conditions)      ASSESSMENT/PLAN:  1. Mixed hyperlipidemia  Assessment & Plan:  Holding on statin therapy for now. May need in future.     Lab Results   Component Value Date    LDLCALC 136 (H) 08/15/2023     The 10-year ASCVD risk score (Yulia OVERTON, et al., 2019) is: 6.2%    Values used to calculate the score:      Age: 58 years      Sex: Female      Is Non- : Yes      Diabetic: No      Tobacco smoker: No      Systolic Blood Pressure: 128 mmHg      Is BP treated: Yes      HDL Cholesterol: 69 mg/dL      Total Cholesterol: 229 mg/dL      The 10-year ASCVD risk score (Yulia OVERTON, et al., 2019) is: 6.2%    Values used to calculate the score:      Age: 58 years      Sex: Female      Is Non- : Yes      Diabetic: No      Tobacco smoker: No      Systolic Blood Pressure: 128 mmHg      Is BP treated: Yes      HDL Cholesterol: 69 mg/dL      Total Cholesterol: 229 mg/dL       2. Essential hypertension  Assessment & Plan:   Well-controlled, continue current medications - lisinopril 10mg daily, medication adherence emphasized and lifestyle modifications recommended. UTD on labs.   3. Prediabetes  4. Glaucoma, unspecified glaucoma type, unspecified laterality  Assessment & Plan:  Following with ophtho at [facility] regularly.    5. Hypertension, benign  -     lisinopril (PRINIVIL;ZESTRIL) 10 MG tablet; Take 1 tablet by mouth daily, Disp-90 tablet, R-1Normal      Return in about 4 months (around 2024) for New MD/DO.    SUBJECTIVE/OBJECTIVE:  HPI    No major complaints. Things are going well.     Review of Systems    Current Outpatient Medications on File Prior to Visit   Medication Sig Dispense Refill    Calcium Citrate-Vitamin D (CALCIUM + D PO) Take by mouth daily      diclofenac sodium (VOLTAREN) 1 % GEL Apply 4 g

## 2024-04-17 NOTE — ASSESSMENT & PLAN NOTE
Holding on statin therapy for now. May need in future.     Lab Results   Component Value Date    LDLCALC 136 (H) 08/15/2023     The 10-year ASCVD risk score (Yulia OVETRON, et al., 2019) is: 6.2%    Values used to calculate the score:      Age: 58 years      Sex: Female      Is Non- : Yes      Diabetic: No      Tobacco smoker: No      Systolic Blood Pressure: 128 mmHg      Is BP treated: Yes      HDL Cholesterol: 69 mg/dL      Total Cholesterol: 229 mg/dL      The 10-year ASCVD risk score (Yulia OVERTON, et al., 2019) is: 6.2%    Values used to calculate the score:      Age: 58 years      Sex: Female      Is Non- : Yes      Diabetic: No      Tobacco smoker: No      Systolic Blood Pressure: 128 mmHg      Is BP treated: Yes      HDL Cholesterol: 69 mg/dL      Total Cholesterol: 229 mg/dL

## 2024-04-24 NOTE — ASSESSMENT & PLAN NOTE
Well-controlled, continue current medications - lisinopril 10mg daily, medication adherence emphasized and lifestyle modifications recommended. UTD on labs.

## 2024-08-05 ENCOUNTER — OFFICE VISIT (OUTPATIENT)
Dept: INTERNAL MEDICINE CLINIC | Age: 59
End: 2024-08-05
Payer: COMMERCIAL

## 2024-08-05 VITALS
HEART RATE: 70 BPM | WEIGHT: 195 LBS | OXYGEN SATURATION: 98 % | DIASTOLIC BLOOD PRESSURE: 84 MMHG | BODY MASS INDEX: 36.84 KG/M2 | SYSTOLIC BLOOD PRESSURE: 124 MMHG

## 2024-08-05 DIAGNOSIS — Z00.00 WELL ADULT EXAM: Primary | ICD-10-CM

## 2024-08-05 DIAGNOSIS — R73.03 PREDIABETES: ICD-10-CM

## 2024-08-05 DIAGNOSIS — I10 ESSENTIAL HYPERTENSION: ICD-10-CM

## 2024-08-05 DIAGNOSIS — Z12.31 ENCOUNTER FOR SCREENING MAMMOGRAM FOR MALIGNANT NEOPLASM OF BREAST: ICD-10-CM

## 2024-08-05 DIAGNOSIS — H61.22 LEFT EAR IMPACTED CERUMEN: ICD-10-CM

## 2024-08-05 DIAGNOSIS — Z23 NEED FOR VACCINATION: ICD-10-CM

## 2024-08-05 DIAGNOSIS — E78.2 MIXED HYPERLIPIDEMIA: ICD-10-CM

## 2024-08-05 PROCEDURE — 3074F SYST BP LT 130 MM HG: CPT | Performed by: STUDENT IN AN ORGANIZED HEALTH CARE EDUCATION/TRAINING PROGRAM

## 2024-08-05 PROCEDURE — 3079F DIAST BP 80-89 MM HG: CPT | Performed by: STUDENT IN AN ORGANIZED HEALTH CARE EDUCATION/TRAINING PROGRAM

## 2024-08-05 PROCEDURE — 99204 OFFICE O/P NEW MOD 45 MIN: CPT | Performed by: STUDENT IN AN ORGANIZED HEALTH CARE EDUCATION/TRAINING PROGRAM

## 2024-08-05 PROCEDURE — 99386 PREV VISIT NEW AGE 40-64: CPT | Performed by: STUDENT IN AN ORGANIZED HEALTH CARE EDUCATION/TRAINING PROGRAM

## 2024-08-05 ASSESSMENT — PATIENT HEALTH QUESTIONNAIRE - PHQ9
1. LITTLE INTEREST OR PLEASURE IN DOING THINGS: NOT AT ALL
SUM OF ALL RESPONSES TO PHQ QUESTIONS 1-9: 0
2. FEELING DOWN, DEPRESSED OR HOPELESS: NOT AT ALL
SUM OF ALL RESPONSES TO PHQ9 QUESTIONS 1 & 2: 0
SUM OF ALL RESPONSES TO PHQ QUESTIONS 1-9: 0

## 2024-08-05 ASSESSMENT — ANXIETY QUESTIONNAIRES

## 2024-08-05 ASSESSMENT — ENCOUNTER SYMPTOMS
COUGH: 0
ABDOMINAL PAIN: 0
WHEEZING: 0
BLOOD IN STOOL: 0
SHORTNESS OF BREATH: 0

## 2024-08-05 NOTE — PATIENT INSTRUCTIONS
Thank you for allowing me to care for you!    Patient instructions:  Fasting labs  Walk 30 min/day 7 days/week  2. Sugar curbing: Glucose goddess (Add each step each week)  Week 1: Savory breakfast (high in protein, no carbs)   Week 2: Apple cider vinegar   Week 3: Food sequencing for at least 1 meal: Veggies -> protein/fat -> starch/carb  Week 4: 10 minutes of physical activity after at least 1 meal a day       Get the Screenings You Need   Screenings are tests that look for diseases before you have symptoms. Blood pressure checks and mammograms are examples of screenings.  You can get some screenings, such as blood pressure readings, in your doctor's office. Others, such as mammograms, need special equipment, so you may need to go to a different office.  After a screening test, ask when you will see the results and who to talk to about them.  Breast Cancer  Ask your health care team whether a mammogram is right for you based on your age, family history, overall health, and personal concerns.  Cervical Cancer  Have a Pap smear every 1 to 3 years if you are 21 to 65 years old and have been sexually active. If you are older than 65 and recent Pap smears were normal, you do not need a Pap smear. If you have had a hysterectomy for a reason other than cancer, you do not need a Pap smear.  Chlamydia and Other Sexually Transmitted Diseases  Sexually transmitted diseases can make it hard to get pregnant, may affect your baby, and can cause other health problems.   Have a screening test for Chlamydia if you are 24 or younger and sexually active. If you are older than 24, talk to your health care team about being screened for Chlamydia.   Ask your doctor or nurse whether you should be screened for other sexually transmitted diseases.  Colorectal Cancer  Have a screening test for colorectal cancer starting at age 50. If you have a family history of colorectal cancer, you may need to be screened earlier. Several different

## 2024-08-05 NOTE — PROGRESS NOTES
Penicillins Nausea And Vomiting     Past Surgical History:   Procedure Laterality Date    ANKLE SURGERY Right 06/14/2013    ORIF right ankle    COLONOSCOPY  12/23/2015    Adenomatous and Hyperplastic polyps.   Dr. Mckeon.  Repeat in 3 years.    COLONOSCOPY  09/09/2020    COLONOSCOPY WITH BIOPSY performed by Ernie DOVER MD at [facility] ENDOSCOPY    DILATION AND CURETTAGE OF UTERUS      PLANTAR FASCIA SURGERY  01/01/2003    Left foot - endoscopic    TONSILLECTOMY AND ADENOIDECTOMY  Child    TUBAL LIGATION        Family History   Problem Relation Age of Onset    Hypertension Mother         DM, Rheumatoid arthritis, CKD    Diabetes Mother     Kidney Disease Mother     Lung Cancer Mother     Hypertension Father         Prostate cancer, Hyperlipidemia    Prostate Cancer Father     Kidney Disease Father     Other Sister         Healthy    Other Sister         Healthy    Prostate Cancer Brother     Other Brother         Healthy    Breast Cancer Neg Hx     Colon Cancer Neg Hx         Review of Systems   Constitutional:  Negative for chills, fatigue and fever.   Respiratory:  Negative for cough, shortness of breath and wheezing.    Cardiovascular:  Negative for chest pain and palpitations.   Gastrointestinal:  Negative for abdominal pain and blood in stool.   Endocrine: Negative for polydipsia, polyphagia and polyuria.   Genitourinary:  Negative for dysuria, hematuria and vaginal bleeding.   Musculoskeletal:  Negative for joint swelling.   Skin:  Negative for rash.   Neurological:  Negative for dizziness, syncope and light-headedness.         Vitals:    08/05/24 0813   BP: 124/84   Site: Right Upper Arm   Position: Sitting   Cuff Size: Medium Adult   Pulse: 70   SpO2: 98%   Weight: 88.5 kg (195 lb)      Body mass index is 36.84 kg/m².     Wt Readings from Last 3 Encounters:   08/05/24 88.5 kg (195 lb)   04/17/24 86.2 kg (190 lb)   09/21/23 84.6 kg (186 lb 9.6 oz)     BP Readings from Last 3 Encounters:   08/05/24

## 2024-08-06 ENCOUNTER — TELEPHONE (OUTPATIENT)
Dept: INTERNAL MEDICINE CLINIC | Age: 59
End: 2024-08-06

## 2024-08-06 DIAGNOSIS — R73.03 PREDIABETES: ICD-10-CM

## 2024-08-06 DIAGNOSIS — I10 ESSENTIAL HYPERTENSION: ICD-10-CM

## 2024-08-06 DIAGNOSIS — Z23 NEED FOR VACCINATION: ICD-10-CM

## 2024-08-06 DIAGNOSIS — E78.2 MIXED HYPERLIPIDEMIA: ICD-10-CM

## 2024-08-06 LAB
ALBUMIN SERPL-MCNC: 4.1 G/DL (ref 3.4–5)
ALBUMIN/GLOB SERPL: 1.3 {RATIO} (ref 1.1–2.2)
ALP SERPL-CCNC: 99 U/L (ref 40–129)
ALT SERPL-CCNC: 14 U/L (ref 10–40)
ANION GAP SERPL CALCULATED.3IONS-SCNC: 12 MMOL/L (ref 3–16)
AST SERPL-CCNC: 15 U/L (ref 15–37)
BILIRUB SERPL-MCNC: 0.3 MG/DL (ref 0–1)
BUN SERPL-MCNC: 14 MG/DL (ref 7–20)
CALCIUM SERPL-MCNC: 9.6 MG/DL (ref 8.3–10.6)
CHLORIDE SERPL-SCNC: 101 MMOL/L (ref 99–110)
CHOLEST SERPL-MCNC: 237 MG/DL (ref 0–199)
CO2 SERPL-SCNC: 27 MMOL/L (ref 21–32)
CREAT SERPL-MCNC: 0.9 MG/DL (ref 0.6–1.1)
EST. AVERAGE GLUCOSE BLD GHB EST-MCNC: 128.4 MG/DL
GFR SERPLBLD CREATININE-BSD FMLA CKD-EPI: 73 ML/MIN/{1.73_M2}
GLUCOSE SERPL-MCNC: 90 MG/DL (ref 70–99)
HBA1C MFR BLD: 6.1 %
HBV SURFACE AB SERPL IA-ACNC: <3.5 MIU/ML
HDLC SERPL-MCNC: 78 MG/DL (ref 40–60)
LDLC SERPL CALC-MCNC: 141 MG/DL
POTASSIUM SERPL-SCNC: 4.7 MMOL/L (ref 3.5–5.1)
PROT SERPL-MCNC: 7.2 G/DL (ref 6.4–8.2)
SODIUM SERPL-SCNC: 140 MMOL/L (ref 136–145)
TRIGL SERPL-MCNC: 89 MG/DL (ref 0–150)
VLDLC SERPL CALC-MCNC: 18 MG/DL

## 2024-08-06 NOTE — TELEPHONE ENCOUNTER
Patient requests lab results. Advised per MyChart they were ready, but she is unable to see them.

## 2024-08-22 ENCOUNTER — TELEMEDICINE (OUTPATIENT)
Dept: INTERNAL MEDICINE CLINIC | Age: 59
End: 2024-08-22
Payer: COMMERCIAL

## 2024-08-22 DIAGNOSIS — E78.2 MIXED HYPERLIPIDEMIA: Primary | ICD-10-CM

## 2024-08-22 DIAGNOSIS — R73.03 PREDIABETES: ICD-10-CM

## 2024-08-22 DIAGNOSIS — Z53.20 REFUSAL OF STATIN MEDICATION BY PATIENT: ICD-10-CM

## 2024-08-22 PROCEDURE — 99214 OFFICE O/P EST MOD 30 MIN: CPT | Performed by: STUDENT IN AN ORGANIZED HEALTH CARE EDUCATION/TRAINING PROGRAM

## 2024-08-22 SDOH — ECONOMIC STABILITY: FOOD INSECURITY: WITHIN THE PAST 12 MONTHS, THE FOOD YOU BOUGHT JUST DIDN'T LAST AND YOU DIDN'T HAVE MONEY TO GET MORE.: NEVER TRUE

## 2024-08-22 SDOH — ECONOMIC STABILITY: FOOD INSECURITY: WITHIN THE PAST 12 MONTHS, YOU WORRIED THAT YOUR FOOD WOULD RUN OUT BEFORE YOU GOT MONEY TO BUY MORE.: NEVER TRUE

## 2024-08-22 SDOH — ECONOMIC STABILITY: INCOME INSECURITY: HOW HARD IS IT FOR YOU TO PAY FOR THE VERY BASICS LIKE FOOD, HOUSING, MEDICAL CARE, AND HEATING?: NOT HARD AT ALL

## 2024-08-22 ASSESSMENT — PATIENT HEALTH QUESTIONNAIRE - PHQ9
SUM OF ALL RESPONSES TO PHQ QUESTIONS 1-9: 0
2. FEELING DOWN, DEPRESSED OR HOPELESS: NOT AT ALL
SUM OF ALL RESPONSES TO PHQ QUESTIONS 1-9: 0
SUM OF ALL RESPONSES TO PHQ9 QUESTIONS 1 & 2: 0
1. LITTLE INTEREST OR PLEASURE IN DOING THINGS: NOT AT ALL
SUM OF ALL RESPONSES TO PHQ QUESTIONS 1-9: 0
SUM OF ALL RESPONSES TO PHQ QUESTIONS 1-9: 0

## 2024-08-22 NOTE — PROGRESS NOTES
Kristin Agee, was evaluated through a synchronous (real-time) audio-video encounter. The patient (or guardian if applicable) is aware that this is a billable service, which includes applicable co-pays. This Virtual Visit was conducted with patient's (and/or legal guardian's) consent. Patient identification was verified, and a caregiver was present when appropriate.   The patient was located at Home: [address]  [place] [address]  Provider was located at Facility (Appt Dept): [address] [place] [address]  Confirm you are appropriately licensed, registered, or certified to deliver care in the state where the patient is located as indicated above. If you are not or unsure, please re-schedule the visit: Yes, I confirm.     Kristin Agee (:  1965) is a Established patient, presenting virtually for evaluation of the following:      Below is the assessment and plan developed based on review of pertinent history, physical exam, labs, studies, and medications.     Assessment & Plan  Mixed hyperlipidemia  -  Ascvd= 5.4%  - Patient not amenable to statin at this time   - Discussed continuation of lifestyle changes   Orders:    Lipid, Fasting; Future    Prediabetes   Well-controlled, continue current medications and lifestyle modifications recommended    Orders:    Lipid, Fasting; Future    Refusal of statin medication by patient    Orders:    Lipid, Fasting; Future      Return for As scheduled .       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

## 2024-08-22 NOTE — ASSESSMENT & PLAN NOTE
Well-controlled, continue current medications and lifestyle modifications recommended    Orders:    Lipid, Fasting; Future

## 2024-08-22 NOTE — ASSESSMENT & PLAN NOTE
- Ascvd= 5.4%  - Patient not amenable to statin at this time   - Discussed continuation of lifestyle changes   Orders:    Lipid, Fasting; Future

## 2024-09-10 ENCOUNTER — TELEPHONE (OUTPATIENT)
Dept: INTERNAL MEDICINE CLINIC | Age: 59
End: 2024-09-10

## 2024-09-25 ENCOUNTER — HOSPITAL ENCOUNTER (OUTPATIENT)
Dept: MAMMOGRAPHY | Age: 59
Discharge: HOME | End: 2024-09-29
Payer: MEDICARE

## 2024-09-25 VITALS — BODY MASS INDEX: 36.52 KG/M2 | WEIGHT: 186 LBS | HEIGHT: 60 IN

## 2024-09-25 DIAGNOSIS — Z12.31 VISIT FOR SCREENING MAMMOGRAM: ICD-10-CM

## 2024-09-25 PROCEDURE — 77063 BREAST TOMOSYNTHESIS BI: CPT

## 2024-09-26 ENCOUNTER — OFFICE VISIT (OUTPATIENT)
Dept: GYNECOLOGY | Age: 59
End: 2024-09-26
Payer: MEDICARE

## 2024-09-26 VITALS — SYSTOLIC BLOOD PRESSURE: 122 MMHG | DIASTOLIC BLOOD PRESSURE: 72 MMHG | OXYGEN SATURATION: 97 % | HEART RATE: 78 BPM

## 2024-09-26 DIAGNOSIS — Z01.419 WELL WOMAN EXAM WITH ROUTINE GYNECOLOGICAL EXAM: Primary | ICD-10-CM

## 2024-09-26 DIAGNOSIS — N84.1 CERVICAL POLYP: ICD-10-CM

## 2024-09-26 DIAGNOSIS — Z78.0 MENOPAUSE: ICD-10-CM

## 2024-09-26 DIAGNOSIS — E66.01 SEVERE OBESITY (BMI 35.0-39.9) WITH COMORBIDITY: ICD-10-CM

## 2024-09-26 PROCEDURE — 3074F SYST BP LT 130 MM HG: CPT | Performed by: OBSTETRICS & GYNECOLOGY

## 2024-09-26 PROCEDURE — 99212 OFFICE O/P EST SF 10 MIN: CPT | Performed by: OBSTETRICS & GYNECOLOGY

## 2024-09-26 PROCEDURE — 3078F DIAST BP <80 MM HG: CPT | Performed by: OBSTETRICS & GYNECOLOGY

## 2024-09-26 PROCEDURE — G0101 CA SCREEN;PELVIC/BREAST EXAM: HCPCS | Performed by: OBSTETRICS & GYNECOLOGY

## 2024-09-26 ASSESSMENT — ENCOUNTER SYMPTOMS
ALLERGIC/IMMUNOLOGIC NEGATIVE: 1
EYES NEGATIVE: 1
GASTROINTESTINAL NEGATIVE: 1
RESPIRATORY NEGATIVE: 1

## 2024-09-27 ENCOUNTER — TELEPHONE (OUTPATIENT)
Dept: INTERNAL MEDICINE CLINIC | Age: 59
End: 2024-09-27

## 2024-12-02 ENCOUNTER — PATIENT MESSAGE (OUTPATIENT)
Dept: INTERNAL MEDICINE CLINIC | Age: 59
End: 2024-12-02

## 2024-12-23 ENCOUNTER — OFFICE VISIT (OUTPATIENT)
Dept: INTERNAL MEDICINE CLINIC | Age: 59
End: 2024-12-23

## 2024-12-23 VITALS
WEIGHT: 185 LBS | DIASTOLIC BLOOD PRESSURE: 70 MMHG | BODY MASS INDEX: 36.32 KG/M2 | SYSTOLIC BLOOD PRESSURE: 130 MMHG | HEIGHT: 60 IN

## 2024-12-23 DIAGNOSIS — I10 HTN (HYPERTENSION), BENIGN: Primary | ICD-10-CM

## 2024-12-23 DIAGNOSIS — E78.2 MIXED HYPERLIPIDEMIA: ICD-10-CM

## 2024-12-23 DIAGNOSIS — H40.9 GLAUCOMA, UNSPECIFIED GLAUCOMA TYPE, UNSPECIFIED LATERALITY: ICD-10-CM

## 2024-12-23 DIAGNOSIS — I10 ESSENTIAL HYPERTENSION: ICD-10-CM

## 2024-12-23 DIAGNOSIS — Z00.00 ANNUAL WELLNESS VISIT: ICD-10-CM

## 2024-12-23 LAB
CHOLEST SERPL-MCNC: 215 MG/DL (ref 0–199)
HDLC SERPL-MCNC: 75 MG/DL (ref 40–60)
LDLC SERPL CALC-MCNC: 124 MG/DL
TRIGL SERPL-MCNC: 82 MG/DL (ref 0–150)
VLDLC SERPL CALC-MCNC: 16 MG/DL

## 2024-12-23 RX ORDER — LISINOPRIL 10 MG/1
10 TABLET ORAL DAILY
Qty: 90 TABLET | Refills: 1 | Status: SHIPPED | OUTPATIENT
Start: 2024-12-23

## 2024-12-23 NOTE — PROGRESS NOTES
2024    Kristin Agee (:  1965) is a 59 y.o. female, here for evaluation of the following medical concerns:    Chief Complaint   Patient presents with    Established New Doctor        HPI    Patient is here to [facility]. Prior patient of Dr. Diallo.     Patient has started on a new diet. She has been trying to eat more fruits and vegetables. She has been doing exercise everyday at the gym. She just got a new job.       Prior to Visit Medications    Medication Sig Taking? Authorizing Provider   lisinopril (PRINIVIL;ZESTRIL) 10 MG tablet Take 1 tablet by mouth daily Yes Trisha Sweet MD   Calcium Citrate-Vitamin D (CALCIUM + D PO) Take by mouth daily Yes Charley Islas MD   diclofenac sodium (VOLTAREN) 1 % GEL Apply 4 g topically 4 times daily Yes Martha Diallo MD   Artificial Tear Ointment (DRY EYES OP) Apply 1 drop to eye 2 times daily  Yes Charley Islas MD   latanoprost (XALATAN) 0.005 % ophthalmic solution Place 1 drop into the right eye nightly Yes Charley Islas MD   brimonidine-timolol (COMBIGAN) 0.2-0.5 % ophthalmic solution Place 1 drop into the right eye in the morning and 1 drop in the evening. 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

## 2024-12-23 NOTE — ASSESSMENT & PLAN NOTE
ASCVD 6.8%. Patient would like to continue trial of lifestyle modification prior to considering starting statin.  She has been eating a healthy diet and exercising.  She has successfully lost 10 pounds in the last 4 months.  -Information given today on Mediterranean diet  - Recommend patient see dietitian  - Continue daily exercise  - Continue weight loss plan  - Recheck lipid panel

## 2024-12-23 NOTE — ASSESSMENT & PLAN NOTE
Congenital absence of left eye. Congenital cataracts.   - Following with ophtho at [facility] regularly.

## 2025-06-10 DIAGNOSIS — I10 HTN (HYPERTENSION), BENIGN: ICD-10-CM

## 2025-06-11 RX ORDER — LISINOPRIL 10 MG/1
10 TABLET ORAL DAILY
Qty: 90 TABLET | Refills: 1 | Status: SHIPPED | OUTPATIENT
Start: 2025-06-11

## 2025-06-16 DIAGNOSIS — Z00.00 ANNUAL WELLNESS VISIT: ICD-10-CM

## 2025-06-16 LAB
25(OH)D3 SERPL-MCNC: 38.2 NG/ML
ALBUMIN SERPL-MCNC: 4.4 G/DL (ref 3.4–5)
ALBUMIN/GLOB SERPL: 1.4 {RATIO} (ref 1.1–2.2)
ALP SERPL-CCNC: 87 U/L (ref 40–129)
ALT SERPL-CCNC: 21 U/L (ref 10–40)
ANION GAP SERPL CALCULATED.3IONS-SCNC: 13 MMOL/L (ref 3–16)
AST SERPL-CCNC: 25 U/L (ref 15–37)
BACTERIA URNS QL MICRO: ABNORMAL /HPF
BASOPHILS # BLD: 0 K/UL (ref 0–0.2)
BASOPHILS NFR BLD: 0.9 %
BILIRUB SERPL-MCNC: 0.4 MG/DL (ref 0–1)
BILIRUB UR QL STRIP.AUTO: NEGATIVE
BUN SERPL-MCNC: 15 MG/DL (ref 7–20)
CALCIUM SERPL-MCNC: 10.1 MG/DL (ref 8.3–10.6)
CHLORIDE SERPL-SCNC: 100 MMOL/L (ref 99–110)
CHOLEST SERPL-MCNC: 247 MG/DL (ref 0–199)
CLARITY UR: CLEAR
CO2 SERPL-SCNC: 25 MMOL/L (ref 21–32)
COLOR UR: YELLOW
CREAT SERPL-MCNC: 0.9 MG/DL (ref 0.6–1.2)
DEPRECATED RDW RBC AUTO: 15.8 % (ref 12.4–15.4)
EOSINOPHIL # BLD: 0.2 K/UL (ref 0–0.6)
EOSINOPHIL NFR BLD: 4.2 %
EPI CELLS #/AREA URNS AUTO: 3 /HPF (ref 0–5)
GFR SERPLBLD CREATININE-BSD FMLA CKD-EPI: 73 ML/MIN/{1.73_M2}
GLUCOSE SERPL-MCNC: 92 MG/DL (ref 70–99)
GLUCOSE UR STRIP.AUTO-MCNC: NEGATIVE MG/DL
HCT VFR BLD AUTO: 39.8 % (ref 36–48)
HDLC SERPL-MCNC: 74 MG/DL (ref 40–60)
HGB BLD-MCNC: 13.3 G/DL (ref 12–16)
HGB UR QL STRIP.AUTO: NEGATIVE
HYALINE CASTS #/AREA URNS AUTO: 0 /LPF (ref 0–8)
KETONES UR STRIP.AUTO-MCNC: NEGATIVE MG/DL
LDLC SERPL CALC-MCNC: 146 MG/DL
LEUKOCYTE ESTERASE UR QL STRIP.AUTO: ABNORMAL
LYMPHOCYTES # BLD: 1.6 K/UL (ref 1–5.1)
LYMPHOCYTES NFR BLD: 32.3 %
MCH RBC QN AUTO: 28.1 PG (ref 26–34)
MCHC RBC AUTO-ENTMCNC: 33.3 G/DL (ref 31–36)
MCV RBC AUTO: 84.5 FL (ref 80–100)
MONOCYTES # BLD: 0.5 K/UL (ref 0–1.3)
MONOCYTES NFR BLD: 9.6 %
NEUTROPHILS # BLD: 2.6 K/UL (ref 1.7–7.7)
NEUTROPHILS NFR BLD: 53 %
NITRITE UR QL STRIP.AUTO: NEGATIVE
PH UR STRIP.AUTO: 6.5 [PH] (ref 5–8)
PLATELET # BLD AUTO: 269 K/UL (ref 135–450)
PMV BLD AUTO: 8.6 FL (ref 5–10.5)
POTASSIUM SERPL-SCNC: 4.9 MMOL/L (ref 3.5–5.1)
PROT SERPL-MCNC: 7.5 G/DL (ref 6.4–8.2)
PROT UR STRIP.AUTO-MCNC: NEGATIVE MG/DL
RBC # BLD AUTO: 4.72 M/UL (ref 4–5.2)
RBC CLUMPS #/AREA URNS AUTO: 1 /HPF (ref 0–4)
SODIUM SERPL-SCNC: 138 MMOL/L (ref 136–145)
SP GR UR STRIP.AUTO: 1.01 (ref 1–1.03)
TRIGL SERPL-MCNC: 133 MG/DL (ref 0–150)
TSH SERPL DL<=0.005 MIU/L-ACNC: 1.76 UIU/ML (ref 0.27–4.2)
UA DIPSTICK W REFLEX MICRO PNL UR: YES
URN SPEC COLLECT METH UR: ABNORMAL
UROBILINOGEN UR STRIP-ACNC: 0.2 E.U./DL
VLDLC SERPL CALC-MCNC: 27 MG/DL
WBC # BLD AUTO: 4.9 K/UL (ref 4–11)
WBC #/AREA URNS AUTO: 4 /HPF (ref 0–5)

## 2025-06-17 ENCOUNTER — RESULTS FOLLOW-UP (OUTPATIENT)
Dept: INTERNAL MEDICINE CLINIC | Age: 60
End: 2025-06-17

## 2025-06-17 LAB
EST. AVERAGE GLUCOSE BLD GHB EST-MCNC: 122.6 MG/DL
HBA1C MFR BLD: 5.9 %

## 2025-06-24 ENCOUNTER — OFFICE VISIT (OUTPATIENT)
Dept: FAMILY MEDICINE CLINIC | Age: 60
End: 2025-06-24
Payer: COMMERCIAL

## 2025-06-24 VITALS
SYSTOLIC BLOOD PRESSURE: 124 MMHG | OXYGEN SATURATION: 99 % | WEIGHT: 193.6 LBS | HEART RATE: 77 BPM | TEMPERATURE: 97.1 F | BODY MASS INDEX: 38.01 KG/M2 | HEIGHT: 60 IN | DIASTOLIC BLOOD PRESSURE: 84 MMHG

## 2025-06-24 DIAGNOSIS — I10 ESSENTIAL HYPERTENSION: ICD-10-CM

## 2025-06-24 DIAGNOSIS — Z00.00 WELCOME TO MEDICARE PREVENTIVE VISIT: Primary | ICD-10-CM

## 2025-06-24 DIAGNOSIS — E78.2 MIXED HYPERLIPIDEMIA: ICD-10-CM

## 2025-06-24 DIAGNOSIS — H40.9 GLAUCOMA, UNSPECIFIED GLAUCOMA TYPE, UNSPECIFIED LATERALITY: ICD-10-CM

## 2025-06-24 DIAGNOSIS — E66.01 MORBID OBESITY DUE TO EXCESS CALORIES (HCC): ICD-10-CM

## 2025-06-24 DIAGNOSIS — K21.9 GASTROESOPHAGEAL REFLUX DISEASE, UNSPECIFIED WHETHER ESOPHAGITIS PRESENT: ICD-10-CM

## 2025-06-24 PROCEDURE — 99214 OFFICE O/P EST MOD 30 MIN: CPT | Performed by: NURSE PRACTITIONER

## 2025-06-24 PROCEDURE — 3079F DIAST BP 80-89 MM HG: CPT | Performed by: NURSE PRACTITIONER

## 2025-06-24 PROCEDURE — 3074F SYST BP LT 130 MM HG: CPT | Performed by: NURSE PRACTITIONER

## 2025-06-24 PROCEDURE — G0402 INITIAL PREVENTIVE EXAM: HCPCS | Performed by: NURSE PRACTITIONER

## 2025-06-24 SDOH — ECONOMIC STABILITY: FOOD INSECURITY: WITHIN THE PAST 12 MONTHS, YOU WORRIED THAT YOUR FOOD WOULD RUN OUT BEFORE YOU GOT MONEY TO BUY MORE.: NEVER TRUE

## 2025-06-24 SDOH — ECONOMIC STABILITY: FOOD INSECURITY: WITHIN THE PAST 12 MONTHS, THE FOOD YOU BOUGHT JUST DIDN'T LAST AND YOU DIDN'T HAVE MONEY TO GET MORE.: NEVER TRUE

## 2025-06-24 ASSESSMENT — PATIENT HEALTH QUESTIONNAIRE - PHQ9
SUM OF ALL RESPONSES TO PHQ QUESTIONS 1-9: 0
SUM OF ALL RESPONSES TO PHQ QUESTIONS 1-9: 0
2. FEELING DOWN, DEPRESSED OR HOPELESS: NOT AT ALL
SUM OF ALL RESPONSES TO PHQ QUESTIONS 1-9: 0
DEPRESSION UNABLE TO ASSESS: FUNCTIONAL CAPACITY MOTIVATION LIMITS ACCURACY
SUM OF ALL RESPONSES TO PHQ QUESTIONS 1-9: 0
1. LITTLE INTEREST OR PLEASURE IN DOING THINGS: NOT AT ALL

## 2025-06-24 ASSESSMENT — LIFESTYLE VARIABLES
HOW MANY STANDARD DRINKS CONTAINING ALCOHOL DO YOU HAVE ON A TYPICAL DAY: PATIENT DOES NOT DRINK
HOW OFTEN DO YOU HAVE A DRINK CONTAINING ALCOHOL: NEVER

## 2025-06-24 NOTE — ASSESSMENT & PLAN NOTE
Chronic, not at goal (unstable), diet, exercise and lifestyle modifications recommended. Education provided on heart health.

## 2025-06-24 NOTE — ASSESSMENT & PLAN NOTE
Chronic, not at goal (unstable), patient declining medication to manage lipids wanting to attempt to manage with diet and lifestyle modifications recommended  The 10-year ASCVD risk score (Yulia OVERTON, et al., 2019) is: 6.3%    Values used to calculate the score:      Age: 59 years      Sex: Female      Is Non- : Yes      Diabetic: No      Tobacco smoker: No      Systolic Blood Pressure: 124 mmHg      Is BP treated: Yes      HDL Cholesterol: 74 mg/dL      Total Cholesterol: 247 mg/dL

## 2025-06-24 NOTE — PROGRESS NOTES
Medicare Annual Wellness Visit    Kristin Agee is here for Medicare AWV (Had labs done 6/16. Wants to discuss diet and making sure she is eating the correct foods. )  This patient is under the primary care of Dr. Sweet who is currently on leave.  I will assume and provide all primary care of this patient until her return to practice at which time all care will be returned to Dr. Sweet. Until that time patient has been made aware that I can provider her care as needed and they agree to this plan.    Assessment & Plan   Assessment & Plan  Morbid obesity due to excess calories (HCC)   Chronic, not at goal (unstable), diet, exercise and lifestyle modifications recommended. Education provided on heart health.          Essential hypertension   Chronic, at goal (stable), continue current treatment plan and lifestyle modifications recommended         Mixed hyperlipidemia   Chronic, not at goal (unstable), patient declining medication to manage lipids wanting to attempt to manage with diet and lifestyle modifications recommended  The 10-year ASCVD risk score (Yulia OVERTON, et al., 2019) is: 6.3%    Values used to calculate the score:      Age: 59 years      Sex: Female      Is Non- : Yes      Diabetic: No      Tobacco smoker: No      Systolic Blood Pressure: 124 mmHg      Is BP treated: Yes      HDL Cholesterol: 74 mg/dL      Total Cholesterol: 247 mg/dL         Gastroesophageal reflux disease, unspecified whether esophagitis present   Chronic, at goal (stable), continue current treatment plan         Glaucoma, unspecified glaucoma type, unspecified laterality   Chronic, at goal (stable), continue current treatment plan   Follows opthalmology       Welcome to Medicare preventive visit   Chronic, at goal (stable), continue current treatment plan            No follow-ups on file.     Subjective    Diagnosis Orders   1. Welcome to Medicare preventive visit        2. Morbid obesity due to excess

## 2025-06-24 NOTE — PATIENT INSTRUCTIONS
Patient declines [facility] resources.        A Healthy Heart: Care Instructions  Overview     Coronary artery disease, also called heart disease, occurs when a substance called plaque builds up in the vessels that supply oxygen-rich blood to your heart muscle. This can narrow the blood vessels and reduce blood flow. A heart attack happens when blood flow is completely blocked. A high-fat diet, smoking, and other factors increase the risk of heart disease.  Your doctor has found that you have a chance of having heart disease. A heart-healthy lifestyle can help keep your heart healthy and prevent heart disease. This lifestyle includes eating healthy, being active, staying at a weight that's healthy for you, and not smoking or using tobacco. It also includes taking medicines as directed, managing other health conditions, and trying to get a healthy amount of sleep.  Follow-up care is a key part of your treatment and safety. Be sure to make and go to all appointments, and call your doctor if you are having problems. It's also a good idea to know your test results and keep a list of the medicines you take.  How can you care for yourself at home?  Diet    Use less salt when you cook and eat. This helps lower your blood pressure. Taste food before salting. Add only a little salt when you think you need it. With time, your taste buds will adjust to less salt.     Eat fewer snack items, fast foods, canned soups, and other high-salt, high-fat, processed foods.     Read food labels and try to avoid saturated and trans fats. They increase your risk of heart disease by raising cholesterol levels.     Limit the amount of solid fat--butter, margarine, and shortening--you eat. Use olive, peanut, or canola oil when you cook. Bake, broil, and steam foods instead of frying them.     Eat a variety of fruit and vegetables every day. Dark green, deep orange, red, or yellow fruits and vegetables are especially good for you. Examples include

## 2025-09-03 ENCOUNTER — OFFICE VISIT (OUTPATIENT)
Dept: FAMILY MEDICINE CLINIC | Age: 60
End: 2025-09-03
Payer: COMMERCIAL

## 2025-09-03 VITALS
DIASTOLIC BLOOD PRESSURE: 78 MMHG | HEIGHT: 60 IN | OXYGEN SATURATION: 98 % | TEMPERATURE: 98.5 F | BODY MASS INDEX: 37.89 KG/M2 | WEIGHT: 193 LBS | SYSTOLIC BLOOD PRESSURE: 122 MMHG | HEART RATE: 83 BPM

## 2025-09-03 DIAGNOSIS — H40.9 GLAUCOMA, UNSPECIFIED GLAUCOMA TYPE, UNSPECIFIED LATERALITY: ICD-10-CM

## 2025-09-03 DIAGNOSIS — J30.1 ALLERGIC RHINITIS DUE TO POLLEN, UNSPECIFIED SEASONALITY: ICD-10-CM

## 2025-09-03 DIAGNOSIS — Z01.810 PREOP CARDIOVASCULAR EXAM: Primary | ICD-10-CM

## 2025-09-03 DIAGNOSIS — I10 ESSENTIAL HYPERTENSION: ICD-10-CM

## 2025-09-03 DIAGNOSIS — Z86.718 HISTORY OF DVT (DEEP VEIN THROMBOSIS): ICD-10-CM

## 2025-09-03 PROCEDURE — 99214 OFFICE O/P EST MOD 30 MIN: CPT | Performed by: NURSE PRACTITIONER

## 2025-09-03 PROCEDURE — 93000 ELECTROCARDIOGRAM COMPLETE: CPT | Performed by: NURSE PRACTITIONER

## 2025-09-03 PROCEDURE — 3074F SYST BP LT 130 MM HG: CPT | Performed by: NURSE PRACTITIONER

## 2025-09-03 PROCEDURE — 3078F DIAST BP <80 MM HG: CPT | Performed by: NURSE PRACTITIONER

## (undated) DEVICE — FORCEPS BX L240CM JAW DIA2.8MM L CAP W/ NDL MIC MESH TOOTH